# Patient Record
Sex: FEMALE | Race: BLACK OR AFRICAN AMERICAN | Employment: FULL TIME | URBAN - METROPOLITAN AREA
[De-identification: names, ages, dates, MRNs, and addresses within clinical notes are randomized per-mention and may not be internally consistent; named-entity substitution may affect disease eponyms.]

---

## 2018-10-17 ENCOUNTER — OFFICE VISIT (OUTPATIENT)
Dept: PHYSICAL MEDICINE AND REHAB | Age: 58
End: 2018-10-17
Payer: COMMERCIAL

## 2018-10-17 VITALS
WEIGHT: 196 LBS | BODY MASS INDEX: 32.65 KG/M2 | HEART RATE: 68 BPM | DIASTOLIC BLOOD PRESSURE: 79 MMHG | SYSTOLIC BLOOD PRESSURE: 127 MMHG | HEIGHT: 65 IN

## 2018-10-17 DIAGNOSIS — E66.9 OBESITY (BMI 30-39.9): ICD-10-CM

## 2018-10-17 DIAGNOSIS — M47.816 LUMBAR SPONDYLOSIS: Primary | ICD-10-CM

## 2018-10-17 DIAGNOSIS — G89.29 CHRONIC BILATERAL LOW BACK PAIN WITHOUT SCIATICA: ICD-10-CM

## 2018-10-17 DIAGNOSIS — M54.50 CHRONIC BILATERAL LOW BACK PAIN WITHOUT SCIATICA: ICD-10-CM

## 2018-10-17 PROCEDURE — G8427 DOCREV CUR MEDS BY ELIG CLIN: HCPCS | Performed by: PHYSICAL MEDICINE & REHABILITATION

## 2018-10-17 PROCEDURE — G8417 CALC BMI ABV UP PARAM F/U: HCPCS | Performed by: PHYSICAL MEDICINE & REHABILITATION

## 2018-10-17 PROCEDURE — 3017F COLORECTAL CA SCREEN DOC REV: CPT | Performed by: PHYSICAL MEDICINE & REHABILITATION

## 2018-10-17 PROCEDURE — G8484 FLU IMMUNIZE NO ADMIN: HCPCS | Performed by: PHYSICAL MEDICINE & REHABILITATION

## 2018-10-17 PROCEDURE — 4004F PT TOBACCO SCREEN RCVD TLK: CPT | Performed by: PHYSICAL MEDICINE & REHABILITATION

## 2018-10-17 PROCEDURE — 99204 OFFICE O/P NEW MOD 45 MIN: CPT | Performed by: PHYSICAL MEDICINE & REHABILITATION

## 2018-10-17 RX ORDER — DULOXETIN HYDROCHLORIDE 20 MG/1
CAPSULE, DELAYED RELEASE ORAL
Qty: 18 CAPSULE | Refills: 0 | Status: SHIPPED | OUTPATIENT
Start: 2018-10-17 | End: 2018-12-18

## 2018-10-17 RX ORDER — IBUPROFEN 600 MG/1
600 TABLET ORAL DAILY PRN
Qty: 30 TABLET | Refills: 2 | Status: SHIPPED | OUTPATIENT
Start: 2018-10-17 | End: 2019-10-15 | Stop reason: SDUPTHER

## 2018-10-17 RX ORDER — DULOXETIN HYDROCHLORIDE 60 MG/1
60 CAPSULE, DELAYED RELEASE ORAL DAILY
Qty: 30 CAPSULE | Refills: 0 | Status: SHIPPED | OUTPATIENT
Start: 2018-10-17 | End: 2018-12-18 | Stop reason: ALTCHOICE

## 2018-10-17 NOTE — PROGRESS NOTES
Take 1 tablet by mouth daily as needed for Pain (with food)     Dispense:  30 tablet     Refill:  2    DULoxetine (CYMBALTA) 20 MG extended release capsule     Sig: Take 1tab daily for 3d then 2 tab daily for 3 days then 3 tab daily X3d then call for new dose. Dispense:  18 capsule     Refill:  0    DULoxetine (CYMBALTA) 60 MG extended release capsule     Sig: Take 1 capsule by mouth daily Start after titration with 20 mg complete     Dispense:  30 capsule     Refill:  0     · Request prior records MRI  The patient was educated about the diagnosis, prognosis, indications, risks and benefits of treatment. An opportunity to ask questions was given to the patient and questions were answered. The patient agreed to proceed with the recommended treatment as described above. Follow up 6 weeks     Thank you for the consultation and for allowing me to participate in the care of this patient. Sincerely,         Gee Garcia D.O., P.T.   Board Certified Physical Medicine and Rehabilitation  Board Certified Electrodiagnostic Medicine

## 2018-10-24 ENCOUNTER — HOSPITAL ENCOUNTER (OUTPATIENT)
Dept: PHYSICAL THERAPY | Age: 58
Setting detail: THERAPIES SERIES
Discharge: HOME | End: 2018-10-24
Payer: COMMERCIAL

## 2018-10-24 PROCEDURE — G8981 BODY POS CURRENT STATUS: HCPCS | Performed by: PHYSICAL THERAPIST

## 2018-10-24 PROCEDURE — G8982 BODY POS GOAL STATUS: HCPCS | Performed by: PHYSICAL THERAPIST

## 2018-10-24 PROCEDURE — 97161 PT EVAL LOW COMPLEX 20 MIN: CPT | Performed by: PHYSICAL THERAPIST

## 2018-10-24 ASSESSMENT — PAIN DESCRIPTION - PROGRESSION: CLINICAL_PROGRESSION: GRADUALLY WORSENING

## 2018-10-24 ASSESSMENT — PAIN DESCRIPTION - LOCATION: LOCATION: BACK

## 2018-10-24 ASSESSMENT — PAIN DESCRIPTION - PAIN TYPE: TYPE: CHRONIC PAIN

## 2018-10-30 ENCOUNTER — HOSPITAL ENCOUNTER (OUTPATIENT)
Dept: PHYSICAL THERAPY | Age: 58
Setting detail: THERAPIES SERIES
Discharge: HOME | End: 2018-10-30
Payer: COMMERCIAL

## 2018-10-30 PROCEDURE — 97110 THERAPEUTIC EXERCISES: CPT

## 2018-10-30 NOTE — PROGRESS NOTES
[address]      Phone: [phone]  Fax: [phone]    Physical Therapy Daily Treatment Note  Date:  10/30/2018    Patient Name:  Vaughn Patterson    :  1960  MRN: [id number]    Restrictions/Precautions:    Diagnosis:  Lumbar spondylosis, chronic B low back pain without sciatica  Treatment Diagnosis:    Insurance/Certification information:  Medical [place]  Referring Physician:  Gee Garcia DO  Plan of care signed (Y/N):    Visit# / total visits:    Pain level: 3/10   Time In:  1022  Time Out:  1053    Subjective:  Nothing new to report    Exercises:  Exercise/Equipment Resistance/Repetitions Other comments     Trunk stretch with ball X 10 reps ea 10 sec hold      SKTC X 5 reps 10 sec hold      DKTC X 5 reps 10 sec hold      Hamstring stretch PFB x 5 reps ea 10 sec hold      Lower trunk rotation X 5 reps ea 10 sec hold       Piriformis stretch X 5 reps ea 10 sec hold      Pelvic tilts X 10              Bike  X 10 mins                                                                               Other : pt reported w/ supine trunk rotation to L side pain 5/10 .   To right side 0/10     Home Exercise Program:  N/A    Manual Treatments:  N/A    Modalities:  E stim PRN    Timed Code Treatment Minutes:  31    Total Treatment Minutes:  31    Treatment/Activity Tolerance:  [x] Patient tolerated treatment well [] Patient limited by fatigue  [] Patient limited by pain  [] Patient limited by other medical complications  [] Other:     Prognosis: [x] Good [] Fair  [] Poor    Patient Requires Follow-up: [x] Yes  [] No    Plan:   [x] Continue per plan of care [] Alter current plan (see comments)  [] Plan of care initiated [] Hold pending MD visit [] Discharge  Plan for Next Session:        Electronically signed by:  Kavon Erickson, PTA [id number]

## 2018-11-01 ENCOUNTER — HOSPITAL ENCOUNTER (OUTPATIENT)
Dept: PHYSICAL THERAPY | Age: 58
Setting detail: THERAPIES SERIES
Discharge: HOME | End: 2018-11-01
Payer: COMMERCIAL

## 2018-11-01 PROCEDURE — 97110 THERAPEUTIC EXERCISES: CPT

## 2018-11-01 PROCEDURE — G0283 ELEC STIM OTHER THAN WOUND: HCPCS

## 2018-11-01 NOTE — PROGRESS NOTES
[address]      Phone: [phone]  Fax: [phone]    Physical Therapy Daily Treatment Note  Date:  2018    Patient Name:  Socrates Fernandez    :  1960  MRN: [id number]    Restrictions/Precautions:    Diagnosis:  Lumbar spondylosis, chronic B low back pain without sciatica  Treatment Diagnosis:    Insurance/Certification information:  Medical [place]  Referring Physician:  Keena Martin DO  Plan of care signed (Y/N):    Visit# / total visits:  3/12  Pain level: 5/10 LBP initially , 3/10 LBP after session   Time In:  1025  Time Out:  1136    Subjective:  Pt reported increase in LBP on arrival, \" I was on my feet all day yesterday. \"  Pt reported a decrease in LBP after session as noted above    Exercises:  Exercise/Equipment Resistance/Repetitions Other comments     Trunk stretch with ball X 5reps ea 20 sec hold      SKTC X 5 reps 20 sec hold      DKTC X 5 reps 20 sec hold      Hamstring stretch            PFB  x 5 reps ea 20 sec hold      Lower trunk rotation X 3 reps ea 20 sec hold       Piriformis stretch  W/ red ball X 3 reps ea 20 sec hold      Pelvic tilts X 10              Bike  X 10 mins                                                                               Other : pt reported  Continued pain w/ supine trunk rotation to L side pain 5/10 .   To right side 0/10     Home Exercise Program:  N/A    Manual Treatments:  N/A    Modalities:  IFC x 15 mins L LB with HP    Timed Code Treatment Minutes:  55    Total Treatment Minutes:  70    Treatment/Activity Tolerance:  [x] Patient tolerated treatment well [] Patient limited by fatigue  [] Patient limited by pain  [] Patient limited by other medical complications  [] Other:     Prognosis: [x] Good [] Fair  [] Poor    Patient Requires Follow-up: [x] Yes  [] No    Plan:   [x] Continue per plan of care [] Alter current plan (see comments)  [] Plan of care initiated [] Hold pending MD visit []

## 2018-11-06 ENCOUNTER — HOSPITAL ENCOUNTER (OUTPATIENT)
Dept: PHYSICAL THERAPY | Age: 58
Setting detail: THERAPIES SERIES
Discharge: HOME | End: 2018-11-06
Payer: COMMERCIAL

## 2018-11-06 PROCEDURE — 97110 THERAPEUTIC EXERCISES: CPT | Performed by: PHYSICAL THERAPIST

## 2018-11-09 ENCOUNTER — HOSPITAL ENCOUNTER (OUTPATIENT)
Dept: PHYSICAL THERAPY | Age: 58
Setting detail: THERAPIES SERIES
Discharge: HOME | End: 2018-11-09
Payer: COMMERCIAL

## 2018-11-13 ENCOUNTER — HOSPITAL ENCOUNTER (OUTPATIENT)
Dept: PHYSICAL THERAPY | Age: 58
Setting detail: THERAPIES SERIES
Discharge: HOME | End: 2018-11-13
Payer: COMMERCIAL

## 2018-11-13 PROCEDURE — 97110 THERAPEUTIC EXERCISES: CPT | Performed by: PHYSICAL THERAPIST

## 2018-11-13 PROCEDURE — G0283 ELEC STIM OTHER THAN WOUND: HCPCS | Performed by: PHYSICAL THERAPIST

## 2018-11-15 ENCOUNTER — HOSPITAL ENCOUNTER (OUTPATIENT)
Dept: PHYSICAL THERAPY | Age: 58
Setting detail: THERAPIES SERIES
Discharge: HOME | End: 2018-11-15
Payer: COMMERCIAL

## 2018-11-15 PROCEDURE — 97110 THERAPEUTIC EXERCISES: CPT | Performed by: PHYSICAL THERAPIST

## 2018-11-20 ENCOUNTER — HOSPITAL ENCOUNTER (OUTPATIENT)
Dept: PHYSICAL THERAPY | Age: 58
Setting detail: THERAPIES SERIES
Discharge: HOME | End: 2018-11-20
Payer: COMMERCIAL

## 2018-11-20 PROCEDURE — 97110 THERAPEUTIC EXERCISES: CPT

## 2018-11-27 ENCOUNTER — HOSPITAL ENCOUNTER (OUTPATIENT)
Dept: PHYSICAL THERAPY | Age: 58
Setting detail: THERAPIES SERIES
Discharge: HOME | End: 2018-11-27
Payer: COMMERCIAL

## 2018-11-28 ENCOUNTER — OFFICE VISIT (OUTPATIENT)
Dept: PHYSICAL MEDICINE AND REHAB | Age: 58
End: 2018-11-28
Payer: COMMERCIAL

## 2018-11-28 VITALS
DIASTOLIC BLOOD PRESSURE: 75 MMHG | WEIGHT: 204 LBS | HEART RATE: 77 BPM | BODY MASS INDEX: 33.99 KG/M2 | HEIGHT: 65 IN | SYSTOLIC BLOOD PRESSURE: 119 MMHG

## 2018-11-28 DIAGNOSIS — E66.9 OBESITY (BMI 30-39.9): ICD-10-CM

## 2018-11-28 DIAGNOSIS — M54.59 MECHANICAL LOW BACK PAIN: ICD-10-CM

## 2018-11-28 DIAGNOSIS — M54.40 BILATERAL LOW BACK PAIN WITH SCIATICA, SCIATICA LATERALITY UNSPECIFIED, UNSPECIFIED CHRONICITY: Primary | ICD-10-CM

## 2018-11-28 PROCEDURE — G8427 DOCREV CUR MEDS BY ELIG CLIN: HCPCS | Performed by: PHYSICAL MEDICINE & REHABILITATION

## 2018-11-28 PROCEDURE — G8484 FLU IMMUNIZE NO ADMIN: HCPCS | Performed by: PHYSICAL MEDICINE & REHABILITATION

## 2018-11-28 PROCEDURE — 3017F COLORECTAL CA SCREEN DOC REV: CPT | Performed by: PHYSICAL MEDICINE & REHABILITATION

## 2018-11-28 PROCEDURE — G8417 CALC BMI ABV UP PARAM F/U: HCPCS | Performed by: PHYSICAL MEDICINE & REHABILITATION

## 2018-11-28 PROCEDURE — 99214 OFFICE O/P EST MOD 30 MIN: CPT | Performed by: PHYSICAL MEDICINE & REHABILITATION

## 2018-11-28 PROCEDURE — 4004F PT TOBACCO SCREEN RCVD TLK: CPT | Performed by: PHYSICAL MEDICINE & REHABILITATION

## 2018-11-28 NOTE — PROGRESS NOTES
Patricia Shelley D.O. [facility]  [address]  Phone: [phone]  Fax: [phone]        11/28/18    Chief Complaint   Patient presents with    Back Pain     6 week follow up       HPI:  Milind Morton is a 66y.o. year old woman seen today in follow up regarding low back pain. Interval history: Since the last visit the patient has been in PT. She never picked up the Cymbalta ordered last visit. Today, the pain is rated Pain Score:   7 where 0 is no pain and 10 is pain as bad as it can be. The pain is located in the low back,  does not radiate and is described as sharp. This pain occurs intermittent. The symptoms have been unchanged since onset. Symptoms are exacerbated by unknown. Factors which relieve the pain include nothing. Other associated symptoms include stiffness. Otherwise, the pain assessment has not changed since the last visit. History reviewed. No pertinent past medical history. Past Surgical History:   Procedure Laterality Date    ABDOMINAL EXPLORATION SURGERY      BREAST SURGERY      COLONOSCOPY  09/11/2012    -normal    ENDOMETRIAL ABLATION      UPPER GASTROINTESTINAL ENDOSCOPY  07/29/2014    multiple gstric and duodenal polyps       Social History     Social History    Marital status:      Spouse name: N/A    Number of children: N/A    Years of education: N/A     Social History Main Topics    Smoking status: Current Every Day Smoker     Packs/day: 1.00     Types: Cigars, Cigarettes    Smokeless tobacco: Never Used      Comment: 2 CIGARS DAILY    Alcohol use No      Comment: SOCIAL OCCAS    Drug use: No    Sexual activity: Not Asked     Other Topics Concern    None     Social History Narrative    None       History reviewed. No pertinent family history.     Current Outpatient Prescriptions   Medication Sig Dispense Refill    DULoxetine (CYMBALTA) 20 MG extended release capsule Take 1tab daily for 3d then 2 tab daily for 3 days then 3 tab daily X3d then call for new dose. 18 capsule 0    acetaminophen (TYLENOL) 325 MG tablet Take 2 tablets by mouth every 6 hours as needed for Pain 20 tablet 0    Cholecalciferol (VITAMIN D3) 56869 units CAPS Take 50,000 Units by mouth once a week 1 capsule 5    ibuprofen (ADVIL;MOTRIN) 600 MG tablet Take 1 tablet by mouth daily as needed for Pain (with food) 30 tablet 2    DULoxetine (CYMBALTA) 60 MG extended release capsule Take 1 capsule by mouth daily Start after titration with 20 mg complete 30 capsule 0     No current facility-administered medications for this visit. Allergies   Allergen Reactions    Aspirin      UPSET STOMACH       Review of Systems:  No new weakness, paresthesia, incontinence of bowel or bladder, saddle anesthesia, falls or gait dysfunction. Otherwise, per HPI. Physical Exam:   Blood pressure 119/75, pulse 77, height 5' 5\" (1.651 m), weight 204 lb (92.5 kg). GENERAL: The patient is in no apparent distress. Body habitus is obese. HEENT: No rhinorrhea, sneezing, yawning, or lacrimation. No scleral icterus or conjunctival injection. SKIN: No piloerection. No tract marks. No rash. PSYCH: Mood and affect are appropriate. Hygiene is appropriate. CARDIOVASCULAR  Heart is regular rate and rhythm. There is no edema. RESPIRATORY: Respirations are regular and unlabored. There is no cyanosis. GASTROINTESTINAL: Soft abdomen, non-tender. MSK: There is no joint effusion, deformity, instability, swelling, erythema or warmth. AROM is full in the spine and extremities. Spinal curvatures are normal.      NEURO: Gait is normal. No focal sensorimotor deficit. Reflexes 2+ and symmetric in lower extremities. Impression:   1. Bilateral low back pain with sciatica, sciatica laterality unspecified, unspecified chronicity    2. Obesity (BMI 30-39.9)    3.  Mechanical low back pain        Plan:  Orders Placed This Encounter

## 2018-11-29 ENCOUNTER — HOSPITAL ENCOUNTER (OUTPATIENT)
Dept: PHYSICAL THERAPY | Age: 58
Setting detail: THERAPIES SERIES
Discharge: HOME | End: 2018-11-29
Payer: COMMERCIAL

## 2018-11-29 PROCEDURE — G0283 ELEC STIM OTHER THAN WOUND: HCPCS | Performed by: PHYSICAL THERAPIST

## 2018-11-29 PROCEDURE — 97110 THERAPEUTIC EXERCISES: CPT | Performed by: PHYSICAL THERAPIST

## 2018-11-29 NOTE — PROGRESS NOTES
[address] [address] [place]      Phone: [phone]  Fax: [phone]    Physical Therapy Daily Treatment Note  Date:  2018    Patient Name:  Jeane Real    :  1960  MRN: [id number]    Restrictions/Precautions:    Diagnosis:  Lumbar spondylosis, chronic B low back pain without sciatica  Treatment Diagnosis:    Insurance/Certification information:  Medical [place]  Referring Physician:  Caroline Lambert DO  Plan of care signed (Y/N):    Visit# / total visits:    Pain level: 7/10  Time In:  1030  Time Out:  1120    Subjective:  Pt reports increased pain today.     Exercises:  Exercise/Equipment Resistance/Repetitions Other comments     Trunk stretch with ball X 5reps ea 20 sec       SKTC X 5 reps 20 sec       DKTC X 5 reps 20 sec       Hamstring stretch            PFB  x 5 reps ea 20 sec       Lower trunk rotation X 5 reps ea 20 sec       Piriformis stretch  W/ red ball X 3 reps ea 20 sec       Pelvic tilts 2 x 10                                                                                     Other : N/A    Home Exercise Program:  N/A    Manual Treatments:  N/A    Modalities: IFC with HP to low back x 15 minutes    Timed Code Treatment Minutes:  35    Total Treatment Minutes:  50    Treatment/Activity Tolerance:  [x] Patient tolerated treatment well [] Patient limited by fatigue  [] Patient limited by pain  [] Patient limited by other medical complications  [] Other:     Prognosis: [x] Good [] Fair  [] Poor    Patient Requires Follow-up: [x] Yes  [] No    Plan:   [x] Continue per plan of care [] Alter current plan (see comments)  [] Plan of care initiated [] Hold pending MD visit [] Discharge  Plan for Next Session:        Electronically signed by:  Rebecca Nathan, PT [id number]

## 2018-12-04 ENCOUNTER — HOSPITAL ENCOUNTER (OUTPATIENT)
Dept: PHYSICAL THERAPY | Age: 58
Setting detail: THERAPIES SERIES
Discharge: HOME | End: 2018-12-04
Payer: COMMERCIAL

## 2018-12-04 PROCEDURE — G0283 ELEC STIM OTHER THAN WOUND: HCPCS

## 2018-12-04 PROCEDURE — 97110 THERAPEUTIC EXERCISES: CPT

## 2018-12-05 ENCOUNTER — TELEPHONE (OUTPATIENT)
Dept: ADMINISTRATIVE | Age: 58
End: 2018-12-05

## 2018-12-06 ENCOUNTER — HOSPITAL ENCOUNTER (OUTPATIENT)
Dept: PHYSICAL THERAPY | Age: 58
Setting detail: THERAPIES SERIES
Discharge: HOME | End: 2018-12-06
Payer: COMMERCIAL

## 2018-12-06 PROCEDURE — G8981 BODY POS CURRENT STATUS: HCPCS | Performed by: PHYSICAL THERAPIST

## 2018-12-06 PROCEDURE — G8982 BODY POS GOAL STATUS: HCPCS | Performed by: PHYSICAL THERAPIST

## 2018-12-06 PROCEDURE — G0283 ELEC STIM OTHER THAN WOUND: HCPCS | Performed by: PHYSICAL THERAPIST

## 2018-12-06 PROCEDURE — 97110 THERAPEUTIC EXERCISES: CPT | Performed by: PHYSICAL THERAPIST

## 2018-12-06 NOTE — PROGRESS NOTES
[address] [address] [place]      Phone: [phone]  Fax: [phone]    Physical Therapy Daily Treatment Note  Date:  2018    Patient Name:  Lisa Diaz    :  1960  MRN: [id number]    Restrictions/Precautions:    Diagnosis:  Lumbar spondylosis, chronic B low back pain without sciatica  Treatment Diagnosis:    Insurance/Certification information:  Medical [place]  Referring Physician:  Riya Elise DO  Plan of care signed (Y/N):    Visit# / total visits:  10/12  Pain level: 7/10  Time In:  1029  Time Out:  1115    Subjective:  Pt reports MRI is scheduled for .     Exercises:  Exercise/Equipment Resistance/Repetitions Other comments     Trunk stretch with ball X 5reps ea 20 sec       SKTC X 5 reps 20 sec       DKTC X 5 reps 20 sec       Hamstring stretch            PFB  x 5 reps ea 20 sec       Lower trunk rotation X 5 reps ea 20 sec       Piriformis stretch  W/ red ball X 3 reps ea 20 sec       Pelvic tilts 2 x 10                                                                                     Other : N/A    Home Exercise Program:  N/A    Manual Treatments:  N/A    Modalities: IFC with HP to low back x 15 minutes    Timed Code Treatment Minutes:  31    Total Treatment Minutes:  46    Treatment/Activity Tolerance:  [x] Patient tolerated treatment well [] Patient limited by fatigue  [] Patient limited by pain  [] Patient limited by other medical complications  [] Other:     Prognosis: [] Good [x] Fair  [] Poor    Patient Requires Follow-up: [x] Yes  [] No    Plan:   [x] Continue per plan of care [] Alter current plan (see comments)  [] Plan of care initiated [] Hold pending MD visit [] Discharge  Plan for Next Session:        Electronically signed by:  Wendy Higgins, PT [id number]

## 2018-12-11 ENCOUNTER — HOSPITAL ENCOUNTER (OUTPATIENT)
Dept: MRI IMAGING | Age: 58
Discharge: HOME | End: 2018-12-13
Payer: COMMERCIAL

## 2018-12-11 ENCOUNTER — HOSPITAL ENCOUNTER (OUTPATIENT)
Dept: PHYSICAL THERAPY | Age: 58
Setting detail: THERAPIES SERIES
Discharge: HOME | End: 2018-12-11
Payer: COMMERCIAL

## 2018-12-11 ENCOUNTER — TELEPHONE (OUTPATIENT)
Dept: PHYSICAL MEDICINE AND REHAB | Age: 58
End: 2018-12-11

## 2018-12-11 DIAGNOSIS — M54.40 BILATERAL LOW BACK PAIN WITH SCIATICA, SCIATICA LATERALITY UNSPECIFIED, UNSPECIFIED CHRONICITY: ICD-10-CM

## 2018-12-11 DIAGNOSIS — E66.9 OBESITY (BMI 30-39.9): ICD-10-CM

## 2018-12-11 DIAGNOSIS — M54.59 MECHANICAL LOW BACK PAIN: ICD-10-CM

## 2018-12-11 PROCEDURE — G0283 ELEC STIM OTHER THAN WOUND: HCPCS

## 2018-12-11 PROCEDURE — 72148 MRI LUMBAR SPINE W/O DYE: CPT

## 2018-12-11 PROCEDURE — 97110 THERAPEUTIC EXERCISES: CPT

## 2018-12-11 NOTE — TELEPHONE ENCOUNTER
----- Message from Sina Kennedy DO sent at 12/11/2018  3:08 PM EST -----  Reviewed test abnormal, please schedule follow up to review/discuss.

## 2018-12-13 ENCOUNTER — HOSPITAL ENCOUNTER (OUTPATIENT)
Dept: PHYSICAL THERAPY | Age: 58
Setting detail: THERAPIES SERIES
Discharge: HOME | End: 2018-12-13
Payer: COMMERCIAL

## 2018-12-13 PROCEDURE — G8983 BODY POS D/C STATUS: HCPCS | Performed by: PHYSICAL THERAPIST

## 2018-12-13 PROCEDURE — G0283 ELEC STIM OTHER THAN WOUND: HCPCS | Performed by: PHYSICAL THERAPIST

## 2018-12-13 PROCEDURE — 97110 THERAPEUTIC EXERCISES: CPT | Performed by: PHYSICAL THERAPIST

## 2018-12-13 PROCEDURE — G8982 BODY POS GOAL STATUS: HCPCS | Performed by: PHYSICAL THERAPIST

## 2018-12-18 ENCOUNTER — OFFICE VISIT (OUTPATIENT)
Dept: PHYSICAL MEDICINE AND REHAB | Age: 58
End: 2018-12-18
Payer: COMMERCIAL

## 2018-12-18 VITALS
WEIGHT: 199 LBS | SYSTOLIC BLOOD PRESSURE: 132 MMHG | HEIGHT: 66 IN | DIASTOLIC BLOOD PRESSURE: 87 MMHG | BODY MASS INDEX: 31.98 KG/M2 | HEART RATE: 58 BPM

## 2018-12-18 DIAGNOSIS — M47.816 FACET ARTHROPATHY, LUMBAR: ICD-10-CM

## 2018-12-18 DIAGNOSIS — M51.36 BULGING LUMBAR DISC: ICD-10-CM

## 2018-12-18 DIAGNOSIS — G89.29 CHRONIC BILATERAL LOW BACK PAIN WITHOUT SCIATICA: Primary | ICD-10-CM

## 2018-12-18 DIAGNOSIS — M54.50 CHRONIC BILATERAL LOW BACK PAIN WITHOUT SCIATICA: Primary | ICD-10-CM

## 2018-12-18 DIAGNOSIS — M51.36 DDD (DEGENERATIVE DISC DISEASE), LUMBAR: ICD-10-CM

## 2018-12-18 PROCEDURE — 4004F PT TOBACCO SCREEN RCVD TLK: CPT | Performed by: PHYSICAL MEDICINE & REHABILITATION

## 2018-12-18 PROCEDURE — G8417 CALC BMI ABV UP PARAM F/U: HCPCS | Performed by: PHYSICAL MEDICINE & REHABILITATION

## 2018-12-18 PROCEDURE — G8484 FLU IMMUNIZE NO ADMIN: HCPCS | Performed by: PHYSICAL MEDICINE & REHABILITATION

## 2018-12-18 PROCEDURE — 3017F COLORECTAL CA SCREEN DOC REV: CPT | Performed by: PHYSICAL MEDICINE & REHABILITATION

## 2018-12-18 PROCEDURE — G8427 DOCREV CUR MEDS BY ELIG CLIN: HCPCS | Performed by: PHYSICAL MEDICINE & REHABILITATION

## 2018-12-18 PROCEDURE — 99214 OFFICE O/P EST MOD 30 MIN: CPT | Performed by: PHYSICAL MEDICINE & REHABILITATION

## 2018-12-18 RX ORDER — TRAMADOL HYDROCHLORIDE 50 MG/1
50 TABLET ORAL EVERY 8 HOURS PRN
Qty: 21 TABLET | Refills: 0 | Status: SHIPPED | OUTPATIENT
Start: 2018-12-18 | End: 2018-12-25

## 2019-01-29 ENCOUNTER — OFFICE VISIT (OUTPATIENT)
Dept: PAIN MANAGEMENT | Age: 59
End: 2019-01-29
Payer: COMMERCIAL

## 2019-01-29 VITALS
SYSTOLIC BLOOD PRESSURE: 124 MMHG | BODY MASS INDEX: 31.82 KG/M2 | TEMPERATURE: 98.1 F | WEIGHT: 198 LBS | HEART RATE: 80 BPM | DIASTOLIC BLOOD PRESSURE: 84 MMHG | HEIGHT: 66 IN | OXYGEN SATURATION: 98 % | RESPIRATION RATE: 16 BRPM

## 2019-01-29 DIAGNOSIS — G89.29 CHRONIC BILATERAL LOW BACK PAIN WITHOUT SCIATICA: ICD-10-CM

## 2019-01-29 DIAGNOSIS — M47.817 LUMBOSACRAL SPONDYLOSIS WITHOUT MYELOPATHY: ICD-10-CM

## 2019-01-29 DIAGNOSIS — G89.4 CHRONIC PAIN SYNDROME: Primary | ICD-10-CM

## 2019-01-29 DIAGNOSIS — M54.50 CHRONIC BILATERAL LOW BACK PAIN WITHOUT SCIATICA: ICD-10-CM

## 2019-01-29 PROCEDURE — 99204 OFFICE O/P NEW MOD 45 MIN: CPT | Performed by: PAIN MEDICINE

## 2019-01-29 PROCEDURE — 3017F COLORECTAL CA SCREEN DOC REV: CPT | Performed by: PAIN MEDICINE

## 2019-01-29 PROCEDURE — G8417 CALC BMI ABV UP PARAM F/U: HCPCS | Performed by: PAIN MEDICINE

## 2019-01-29 PROCEDURE — 4004F PT TOBACCO SCREEN RCVD TLK: CPT | Performed by: PAIN MEDICINE

## 2019-01-29 PROCEDURE — G8427 DOCREV CUR MEDS BY ELIG CLIN: HCPCS | Performed by: PAIN MEDICINE

## 2019-01-29 PROCEDURE — G8484 FLU IMMUNIZE NO ADMIN: HCPCS | Performed by: PAIN MEDICINE

## 2019-01-29 RX ORDER — IBUPROFEN 200 MG
200 TABLET ORAL EVERY 6 HOURS PRN
COMMUNITY
End: 2022-04-20

## 2019-01-29 RX ORDER — HYDROCODONE BITARTRATE AND ACETAMINOPHEN 5; 325 MG/1; MG/1
TABLET ORAL
Refills: 0 | COMMUNITY
Start: 2019-01-10 | End: 2019-01-29

## 2019-01-29 RX ORDER — AMOXICILLIN 500 MG/1
CAPSULE ORAL
Refills: 0 | COMMUNITY
Start: 2019-01-06 | End: 2019-01-29

## 2020-01-22 NOTE — DISCHARGE SUMMARY
[address]      Phone: [phone]  Fax: [phone]    Physical Therapy  Out Patient Discharge Summary     Date:  2018    Patient Name:  Katerine Doll    :  1960  MRN: [id number]    DIAGNOSIS:  Lumbar spondylosis, chronic B low back pain without sciatica  REFERRING PHYSICIAN:  Isabel Vieira DO    ATTENDANCE:  Pt has attended 15 of 12 scheduled treatments from 10/24/18 to 18. TREATMENTS RECEIVED:  Stretching, core strengthening, education in a HEP, E-stim    INITIAL STATUS:  · Pain in low back 3-4/10 on average  · Strength: B hips and core 4/5    FINAL STATUS:  · Pain in low back continues to be 3-5/10 on average  · Strength 5/5 throughout  · Pt is independent with HEP    GOALS:  2 out of 3 Long Term Goals were obtained. LONG TERM GOALS NOT OBTAINED/REASON:  Long term pain did not respond to PT interventions     PATIENT GOALS:  To decrease pain    REASON FOR DISCHARGE:  Pt has received maximum benefit from PT    PATIENT EDUCATION/INSTRUCTIONS:  Pt provided with a HEP of stretches and core stability exercises. RECOMMENDATIONS:  Discharge to HEP. Pt received pain relief from E-stim and would benefit from a home TENS unit. Thank you for the opportunity to work with your patient. If you have questions or comments, please feel free to contact me by phone or fax.       Electronically Signed by: Ron Jarvis PT [id number]  2018 Check x-ray today, will message with results  Try tizanidine muscle relaxant every  6 hours as needed, may continue with small doses of ibuprofen as needed  See neck exercises below  Try alternative medication for cholesterol - rosuvastatin 10mg daily. Call if you develop muscle aches with this med as well      Neck Exercises: Arm Lift     Â       To start, lie on your back, knees bent and feet flat on the floor. Keep your ears, shoulders, and hips aligned, but donât press your lower back to the floor. Rest your hands on your pelvis. Breathe deeply and relax. Â· Raise one arm overhead, then lower it. As you lower that arm, raise the other arm. Â· Continue to move both arms in slow, smooth arcs. Keep your arms straight and your head and neck relaxed. Â· RepeatÂ 10Â times with each arm. For your safety, check with your healthcare provider before starting an exercise program.     Neck Exercises: Neck/Torso Rotation   To start, lie on your back, knees bent and feet flat on the floor. Keep your ears, shoulders, and hips aligned, but donât press your lower back to the floor. Breathe deeply and relax. Â· From starting position, drop both knees to one side. At the same time, turn your head and look in the other direction. Â· Keep both feet in contact with the floor and keep your arms at your sides. Â· Hold for five seconds. Then slowly switch sides. Â· Repeat five times. Exercises: Neck Isometrics  To start, sit in a chair with your feet flat on the floor. Your weight should be slightly forward so that youâre balanced evenly on your buttocks. Relax your shoulders and keep your head level. Using a chair with arms may help you keep your balance. 1. Press your palm against your forehead. Resist with your neck muscles. Hold forÂ 10Â seconds. Relax. Repeat 5 times. 2. Do the exercise again, pressing on the side of your head. RepeatÂ 5Â times. Switch sides. 3. Do the exercise again, pressing on the back of your head. RepeatÂ 5Â times. For your safety, check with your healthcare provider before starting an exercise program.     Neck Exercises: Head Lifts  Â   Do this exercise on your hands and knees. Keep your knees under your hips and your hands under your shoulders. Keep your spine in a neutral position (not arched or sagging). Keep your ears in line with your shoulders. Hold for a few seconds before starting the exercise. Â· Keeping your back straight, slowly drop your chin toward your chest. Tuck in your chin. Â· Hold forÂ 5Â seconds. Then lift your head until your neck is level with your back. Â· Hold forÂ 5Â seconds. RepeatÂ 5Â times. Neck Exercises: Active Neck Rotation    To start, lie on your back, knees bent and feet flat on the floor. Keep your ears, shoulders, and hips aligned, but donât press your lower back to the floor. Rest your hands on your pelvis. Breathe deeply and relax. Â· Use your neck muscles to turn your head to one side until you feel a stretch in the muscles. Â· Hold forÂ 5Â seconds. Then turn to the other side. Â· RepeatÂ 5Â times on each side. Note: Keep your shoulders on the floor. Donât lift or tuck your chin as you turn your head. Â© [place], 2016 [facility], [place], [facility] [address] All rights reserved. This information is not intended as a substitute for professional medical care. Always follow your healthcare professional's instructions.

## 2020-08-11 ENCOUNTER — HOSPITAL ENCOUNTER (INPATIENT)
Age: 60
LOS: 1 days | Discharge: HOME | DRG: 156 | End: 2020-08-12
Attending: EMERGENCY MEDICINE | Admitting: INTERNAL MEDICINE
Payer: COMMERCIAL

## 2020-08-11 ENCOUNTER — APPOINTMENT (OUTPATIENT)
Dept: GENERAL RADIOLOGY | Age: 60
DRG: 156 | End: 2020-08-11
Payer: COMMERCIAL

## 2020-08-11 PROBLEM — J96.00 ACUTE RESPIRATORY FAILURE (HCC): Status: ACTIVE | Noted: 2020-08-11

## 2020-08-11 LAB
ALBUMIN SERPL-MCNC: 3.7 G/DL (ref 3.5–5.2)
ALP BLD-CCNC: 83 U/L (ref 35–104)
ALT SERPL-CCNC: 10 U/L (ref 0–32)
ANION GAP SERPL CALCULATED.3IONS-SCNC: 11 MMOL/L (ref 7–16)
AST SERPL-CCNC: 15 U/L (ref 0–31)
BACTERIA: NORMAL /HPF
BASOPHILS ABSOLUTE: 0.03 E9/L (ref 0–0.2)
BASOPHILS RELATIVE PERCENT: 0.5 % (ref 0–2)
BILIRUB SERPL-MCNC: 0.3 MG/DL (ref 0–1.2)
BILIRUBIN URINE: NEGATIVE
BLOOD, URINE: ABNORMAL
BUN BLDV-MCNC: 10 MG/DL (ref 6–20)
CALCIUM SERPL-MCNC: 8.9 MG/DL (ref 8.6–10.2)
CHLORIDE BLD-SCNC: 104 MMOL/L (ref 98–107)
CLARITY: CLEAR
CO2: 21 MMOL/L (ref 22–29)
COLOR: YELLOW
CREAT SERPL-MCNC: 0.9 MG/DL (ref 0.5–1)
EOSINOPHILS ABSOLUTE: 0.06 E9/L (ref 0.05–0.5)
EOSINOPHILS RELATIVE PERCENT: 0.9 % (ref 0–6)
EPITHELIAL CELLS, UA: NORMAL /HPF
GFR AFRICAN AMERICAN: >60
GFR NON-AFRICAN AMERICAN: >60 ML/MIN/1.73
GLUCOSE BLD-MCNC: 113 MG/DL (ref 74–99)
GLUCOSE URINE: NEGATIVE MG/DL
HCT VFR BLD CALC: 43.5 % (ref 34–48)
HEMOGLOBIN: 14.9 G/DL (ref 11.5–15.5)
IMMATURE GRANULOCYTES #: 0.02 E9/L
IMMATURE GRANULOCYTES %: 0.3 % (ref 0–5)
KETONES, URINE: NEGATIVE MG/DL
LACTIC ACID: 2.4 MMOL/L (ref 0.5–2.2)
LEUKOCYTE ESTERASE, URINE: NEGATIVE
LYMPHOCYTES ABSOLUTE: 1.13 E9/L (ref 1.5–4)
LYMPHOCYTES RELATIVE PERCENT: 17.7 % (ref 20–42)
MCH RBC QN AUTO: 32.6 PG (ref 26–35)
MCHC RBC AUTO-ENTMCNC: 34.3 % (ref 32–34.5)
MCV RBC AUTO: 95.2 FL (ref 80–99.9)
MONOCYTES ABSOLUTE: 0.26 E9/L (ref 0.1–0.95)
MONOCYTES RELATIVE PERCENT: 4.1 % (ref 2–12)
NEUTROPHILS ABSOLUTE: 4.89 E9/L (ref 1.8–7.3)
NEUTROPHILS RELATIVE PERCENT: 76.5 % (ref 43–80)
NITRITE, URINE: NEGATIVE
PDW BLD-RTO: 13 FL (ref 11.5–15)
PH UA: 5.5 (ref 5–9)
PLATELET # BLD: 199 E9/L (ref 130–450)
PMV BLD AUTO: 8.7 FL (ref 7–12)
POTASSIUM REFLEX MAGNESIUM: 4 MMOL/L (ref 3.5–5)
PROTEIN UA: NEGATIVE MG/DL
RBC # BLD: 4.57 E12/L (ref 3.5–5.5)
RBC UA: NORMAL /HPF (ref 0–2)
SODIUM BLD-SCNC: 136 MMOL/L (ref 132–146)
SPECIFIC GRAVITY UA: 1.01 (ref 1–1.03)
STREP GRP A PCR: NEGATIVE
TOTAL PROTEIN: 6.8 G/DL (ref 6.4–8.3)
TROPONIN: <0.01 NG/ML (ref 0–0.03)
UROBILINOGEN, URINE: 0.2 E.U./DL
WBC # BLD: 6.4 E9/L (ref 4.5–11.5)
WBC UA: NORMAL /HPF (ref 0–5)

## 2020-08-11 PROCEDURE — 71045 X-RAY EXAM CHEST 1 VIEW: CPT

## 2020-08-11 PROCEDURE — 87088 URINE BACTERIA CULTURE: CPT

## 2020-08-11 PROCEDURE — 2580000003 HC RX 258: Performed by: EMERGENCY MEDICINE

## 2020-08-11 PROCEDURE — 80053 COMPREHEN METABOLIC PANEL: CPT

## 2020-08-11 PROCEDURE — 99283 EMERGENCY DEPT VISIT LOW MDM: CPT

## 2020-08-11 PROCEDURE — 87880 STREP A ASSAY W/OPTIC: CPT

## 2020-08-11 PROCEDURE — 96372 THER/PROPH/DIAG INJ SC/IM: CPT

## 2020-08-11 PROCEDURE — 6360000002 HC RX W HCPCS: Performed by: EMERGENCY MEDICINE

## 2020-08-11 PROCEDURE — 96374 THER/PROPH/DIAG INJ IV PUSH: CPT

## 2020-08-11 PROCEDURE — 6370000000 HC RX 637 (ALT 250 FOR IP): Performed by: EMERGENCY MEDICINE

## 2020-08-11 PROCEDURE — 36415 COLL VENOUS BLD VENIPUNCTURE: CPT

## 2020-08-11 PROCEDURE — 81001 URINALYSIS AUTO W/SCOPE: CPT

## 2020-08-11 PROCEDURE — 51701 INSERT BLADDER CATHETER: CPT

## 2020-08-11 PROCEDURE — G0378 HOSPITAL OBSERVATION PER HR: HCPCS

## 2020-08-11 PROCEDURE — 85025 COMPLETE CBC W/AUTO DIFF WBC: CPT

## 2020-08-11 PROCEDURE — 99284 EMERGENCY DEPT VISIT MOD MDM: CPT

## 2020-08-11 PROCEDURE — 84484 ASSAY OF TROPONIN QUANT: CPT

## 2020-08-11 PROCEDURE — 93005 ELECTROCARDIOGRAM TRACING: CPT | Performed by: EMERGENCY MEDICINE

## 2020-08-11 PROCEDURE — 2060000000 HC ICU INTERMEDIATE R&B

## 2020-08-11 PROCEDURE — 87040 BLOOD CULTURE FOR BACTERIA: CPT

## 2020-08-11 PROCEDURE — U0002 COVID-19 LAB TEST NON-CDC: HCPCS

## 2020-08-11 PROCEDURE — 83605 ASSAY OF LACTIC ACID: CPT

## 2020-08-11 RX ORDER — ACETAMINOPHEN 500 MG
1000 TABLET ORAL ONCE
Status: COMPLETED | OUTPATIENT
Start: 2020-08-11 | End: 2020-08-11

## 2020-08-11 RX ORDER — 0.9 % SODIUM CHLORIDE 0.9 %
1000 INTRAVENOUS SOLUTION INTRAVENOUS ONCE
Status: COMPLETED | OUTPATIENT
Start: 2020-08-11 | End: 2020-08-11

## 2020-08-11 RX ORDER — DEXAMETHASONE SODIUM PHOSPHATE 10 MG/ML
6 INJECTION, SOLUTION INTRAMUSCULAR; INTRAVENOUS ONCE
Status: COMPLETED | OUTPATIENT
Start: 2020-08-11 | End: 2020-08-11

## 2020-08-11 RX ADMIN — SODIUM CHLORIDE 1000 ML: 9 INJECTION, SOLUTION INTRAVENOUS at 22:05

## 2020-08-11 RX ADMIN — DEXAMETHASONE SODIUM PHOSPHATE 6 MG: 10 INJECTION INTRAMUSCULAR; INTRAVENOUS at 22:50

## 2020-08-11 RX ADMIN — ACETAMINOPHEN 1000 MG: 500 TABLET ORAL at 22:05

## 2020-08-11 ASSESSMENT — ENCOUNTER SYMPTOMS
SHORTNESS OF BREATH: 0
EYE REDNESS: 0
VOMITING: 0
ABDOMINAL PAIN: 0
NAUSEA: 0
COUGH: 1

## 2020-08-11 ASSESSMENT — PAIN SCALES - GENERAL: PAINLEVEL_OUTOF10: 0

## 2020-08-12 VITALS
HEIGHT: 65 IN | BODY MASS INDEX: 34.16 KG/M2 | DIASTOLIC BLOOD PRESSURE: 81 MMHG | RESPIRATION RATE: 16 BRPM | OXYGEN SATURATION: 99 % | SYSTOLIC BLOOD PRESSURE: 154 MMHG | TEMPERATURE: 97.1 F | WEIGHT: 205 LBS | HEART RATE: 73 BPM

## 2020-08-12 PROBLEM — R50.9 FEVER AND CHILLS: Status: ACTIVE | Noted: 2020-08-12

## 2020-08-12 LAB
EKG ATRIAL RATE: 99 BPM
EKG P AXIS: 46 DEGREES
EKG P-R INTERVAL: 178 MS
EKG Q-T INTERVAL: 346 MS
EKG QRS DURATION: 104 MS
EKG QTC CALCULATION (BAZETT): 444 MS
EKG R AXIS: 85 DEGREES
EKG T AXIS: 14 DEGREES
EKG VENTRICULAR RATE: 99 BPM
SARS-COV-2, NAAT: NOT DETECTED

## 2020-08-12 PROCEDURE — 6360000002 HC RX W HCPCS: Performed by: INTERNAL MEDICINE

## 2020-08-12 PROCEDURE — 93010 ELECTROCARDIOGRAM REPORT: CPT | Performed by: INTERNAL MEDICINE

## 2020-08-12 PROCEDURE — 6370000000 HC RX 637 (ALT 250 FOR IP): Performed by: INTERNAL MEDICINE

## 2020-08-12 PROCEDURE — G0378 HOSPITAL OBSERVATION PER HR: HCPCS

## 2020-08-12 PROCEDURE — 94664 DEMO&/EVAL PT USE INHALER: CPT

## 2020-08-12 RX ORDER — CLARITHROMYCIN 500 MG/1
500 TABLET, COATED ORAL 2 TIMES DAILY
Qty: 20 TABLET | Refills: 0 | Status: SHIPPED | OUTPATIENT
Start: 2020-08-12 | End: 2020-08-22

## 2020-08-12 RX ORDER — BENZONATATE 100 MG/1
100 CAPSULE ORAL 3 TIMES DAILY PRN
Status: DISCONTINUED | OUTPATIENT
Start: 2020-08-12 | End: 2020-08-12 | Stop reason: HOSPADM

## 2020-08-12 RX ORDER — IPRATROPIUM BROMIDE AND ALBUTEROL SULFATE 2.5; .5 MG/3ML; MG/3ML
1 SOLUTION RESPIRATORY (INHALATION) 4 TIMES DAILY
Status: DISCONTINUED | OUTPATIENT
Start: 2020-08-12 | End: 2020-08-12 | Stop reason: HOSPADM

## 2020-08-12 RX ORDER — ACETAMINOPHEN 325 MG/1
650 TABLET ORAL EVERY 4 HOURS PRN
Status: DISCONTINUED | OUTPATIENT
Start: 2020-08-12 | End: 2020-08-12 | Stop reason: HOSPADM

## 2020-08-12 RX ADMIN — ENOXAPARIN SODIUM 40 MG: 40 INJECTION SUBCUTANEOUS at 09:13

## 2020-08-12 RX ADMIN — IPRATROPIUM BROMIDE AND ALBUTEROL SULFATE 1 AMPULE: .5; 3 SOLUTION RESPIRATORY (INHALATION) at 10:27

## 2020-08-12 ASSESSMENT — PAIN SCALES - GENERAL
PAINLEVEL_OUTOF10: 0

## 2020-08-12 NOTE — ED PROVIDER NOTES
endoscopy (07/29/2014); Breast surgery; and Endometrial ablation. Social History:  reports that she has been smoking cigars and cigarettes. She started smoking about 40 years ago. She has a 39.00 pack-year smoking history. She has never used smokeless tobacco. She reports that she does not drink alcohol or use drugs. Family History: family history is not on file. The patients home medications have been reviewed. Allergies: Aspirin    ---------------------------------------------------PHYSICAL EXAM--------------------------------------    Constitutional/General: Alert and oriented x3, well appearing, non toxic in NAD  Head: Normocephalic and atraumatic  Nose: Moderate rhinorrhea present  Mouth: Oropharynx clear, handling secretions, no trismus  Neck: Supple, full ROM,   Pulmonary: Lungs clear to auscultation bilaterally, no wheezes, rales, or rhonchi. Not in respiratory distress  Cardiovascular:  Regular rate. Regular rhythm. No murmurs  Chest: no chest wall tenderness  Abdomen: Soft. Non tender. Non distended. +BS. No rebound, guarding, or rigidity. No pulsatile masses appreciated. Musculoskeletal: Moves all extremities x 4. Warm and well perfused, no clubbing, cyanosis, or edema. Capillary refill <3 seconds  Skin: warm and dry. No rashes. Neurologic: GCS 15, no gross focal neurologic deficits  Psych: Normal Affect    -------------------------------------------------- RESULTS -------------------------------------------------  I have personally reviewed all laboratory and imaging results for this patient. Results are listed below.      LABS:  Results for orders placed or performed during the hospital encounter of 08/11/20   Strep Screen Group A Throat    Specimen: Throat   Result Value Ref Range    Strep Grp A PCR Negative Negative   CBC Auto Differential   Result Value Ref Range    WBC 6.4 4.5 - 11.5 E9/L    RBC 4.57 3.50 - 5.50 E12/L    Hemoglobin 14.9 11.5 - 15.5 g/dL    Hematocrit 43.5 34.0 - 48.0 %    MCV 95.2 80.0 - 99.9 fL    MCH 32.6 26.0 - 35.0 pg    MCHC 34.3 32.0 - 34.5 %    RDW 13.0 11.5 - 15.0 fL    Platelets 274 392 - 697 E9/L    MPV 8.7 7.0 - 12.0 fL    Neutrophils % 76.5 43.0 - 80.0 %    Immature Granulocytes % 0.3 0.0 - 5.0 %    Lymphocytes % 17.7 (L) 20.0 - 42.0 %    Monocytes % 4.1 2.0 - 12.0 %    Eosinophils % 0.9 0.0 - 6.0 %    Basophils % 0.5 0.0 - 2.0 %    Neutrophils Absolute 4.89 1.80 - 7.30 E9/L    Immature Granulocytes # 0.02 E9/L    Lymphocytes Absolute 1.13 (L) 1.50 - 4.00 E9/L    Monocytes Absolute 0.26 0.10 - 0.95 E9/L    Eosinophils Absolute 0.06 0.05 - 0.50 E9/L    Basophils Absolute 0.03 0.00 - 0.20 E9/L   Comprehensive Metabolic Panel w/ Reflex to MG   Result Value Ref Range    Sodium 136 132 - 146 mmol/L    Potassium reflex Magnesium 4.0 3.5 - 5.0 mmol/L    Chloride 104 98 - 107 mmol/L    CO2 21 (L) 22 - 29 mmol/L    Anion Gap 11 7 - 16 mmol/L    Glucose 113 (H) 74 - 99 mg/dL    BUN 10 6 - 20 mg/dL    CREATININE 0.9 0.5 - 1.0 mg/dL    GFR Non-African American >60 >=60 mL/min/1.73    GFR African American >60     Calcium 8.9 8.6 - 10.2 mg/dL    Total Protein 6.8 6.4 - 8.3 g/dL    Alb 3.7 3.5 - 5.2 g/dL    Total Bilirubin 0.3 0.0 - 1.2 mg/dL    Alkaline Phosphatase 83 35 - 104 U/L    ALT 10 0 - 32 U/L    AST 15 0 - 31 U/L   Lactic Acid, Plasma   Result Value Ref Range    Lactic Acid 2.4 (H) 0.5 - 2.2 mmol/L   Urinalysis   Result Value Ref Range    Color, UA Yellow Straw/Yellow    Clarity, UA Clear Clear    Glucose, Ur Negative Negative mg/dL    Bilirubin Urine Negative Negative    Ketones, Urine Negative Negative mg/dL    Specific Gravity, UA 1.015 1.005 - 1.030    Blood, Urine TRACE (A) Negative    pH, UA 5.5 5.0 - 9.0    Protein, UA Negative Negative mg/dL    Urobilinogen, Urine 0.2 <2.0 E.U./dL    Nitrite, Urine Negative Negative    Leukocyte Esterase, Urine Negative Negative   Microscopic Urinalysis   Result Value Ref Range    WBC, UA NONE 0 - 5 /HPF    RBC, UA 0-1 0 - 2 /HPF    Epithelial Cells, UA RARE /HPF    Bacteria, UA NONE SEEN None Seen /HPF   Troponin   Result Value Ref Range    Troponin <0.01 0.00 - 0.03 ng/mL       RADIOLOGY:  Interpreted by Radiologist.  XR CHEST PORTABLE   Final Result      Interstitial prominence in perihilar locations could suggest   peribronchial inflammatory changes. No focal airspace opacity or   pleural effusion. EKG:  This EKG is signed and interpreted by me. Normal sinus rhythm, rate of 99, incomplete right bundle branch block, no ST segment elevation, T wave inversion in the anterior leads, no prior EKG for comparison. Interpreted by me      ------------------------- NURSING NOTES AND VITALS REVIEWED ---------------------------   The nursing notes within the ED encounter and vital signs as below have been reviewed by myself. /70   Pulse 95   Temp 99.4 °F (37.4 °C) (Oral)   Resp 20   Ht 5' 5\" (1.651 m)   Wt 205 lb (93 kg)   SpO2 95%   BMI 34.11 kg/m²   Oxygen Saturation Interpretation: Abnormal    The patients available past medical records and past encounters were reviewed. ------------------------------ ED COURSE/MEDICAL DECISION MAKING----------------------  Medications   acetaminophen (TYLENOL) tablet 1,000 mg (1,000 mg Oral Given 8/11/20 2205)   0.9 % sodium chloride bolus (0 mLs Intravenous Stopped 8/11/20 2250)   dexamethasone (PF) (DECADRON) injection 6 mg (6 mg Intravenous Given 8/11/20 2250)             Medical Decision Making:   Patient is a 57-year-old female present emergency department with a chief complaint of URI beginning approximately 24 hours prior to arrival.  The patient febrile on arrival, patient labs and imaging reviewed. Patient found to be hypoxic on ambulation. Patient will be admitted for further treatment and evaluation. Re-Evaluations/Consultations:             ED Course as of Aug 12 0009   Tue Aug 11, 2020   2104 The patient is in the bed in no acute distress.     [MT] 2202 Patient is in the bed in no acute distress. Results of today to this point discussed. We will attempt to ambulate the patient    [MT]   2243 Spoke with Dr. Keith Garza. He will accept the patient for admission. [MT]      ED Course User Index  [MT] Magnolia Saucedo DO         Critical Care: Please note that the withdrawal or failure to initiate urgent interventions for this patient would likely result in a life threatening deterioration or permanent disability. Accordingly this patient received 0 minutes of critical care time, excluding separately billable procedures. This patient's ED course included: History, physical examination, reevaluation prior to disposition, labs, imaging, telemetry monitoring, EKG, IV fluid, Tylenol Decadron for likely suspicion of COVID, Requiring supplemental oxygen      This patient has remained hemodynamically stable during their ED course. Counseling: The emergency provider has spoken with the patient and discussed todays results, in addition to providing specific details for the plan of care and counseling regarding the diagnosis and prognosis. Questions are answered at this time and they are agreeable with the plan.       --------------------------------- IMPRESSION AND DISPOSITION ---------------------------------    IMPRESSION  1. Acute respiratory failure with hypoxia (HCC)    2. Upper respiratory tract infection, unspecified type        DISPOSITION  Disposition: Admit to telemetry  Patient condition is stable        NOTE: This report was transcribed using voice recognition software.  Every effort was made to ensure accuracy; however, inadvertent computerized transcription errors may be present         Magnolia Saucedo DO  08/12/20 0009

## 2020-08-12 NOTE — CARE COORDINATION
Discharge order noted on chart. Pt ambulating independently, tolerating well--per nursing. She plans to discharge home with no anticipated needs. She will follow up with her PCP. Afebrile through the night. CM will follow for any needs that arise.   Lazaro Hernandez RN  [facility] Case Management  [phone]

## 2020-08-12 NOTE — ED NOTES
VSS. Tele NSR. Chart copied. Report called. PAS to transfer.  Pt aware     Tyler Givens RN  08/12/20 7951

## 2020-08-12 NOTE — ED NOTES
IV initiated, fluid infusion started per physicians orders. Labs draw and sent to lab.      Cyril Garay RN  08/11/20 6389

## 2020-08-12 NOTE — H&P
Jesus Melton MD FACP   History and Physical      CHIEF COMPLAINT: Sinus congestion and fever      HISTORY OF PRESENT ILLNESS:    82-year-old woman seen on the floor earlier this morning at [address] [place] [address] with ER physician overnight  She presented to emergency room with 3 days history of sinus congestion minimal cough associated with fever  COVID testing was negative    No sick contacts  No leukocytosis  Admitted for further management  Feels much better this morning  Data reviewed in detail  Past Medical History:    History reviewed. No pertinent past medical history. Past Surgical History:    Past Surgical History:   Procedure Laterality Date    ABDOMINAL EXPLORATION SURGERY      BREAST SURGERY      COLONOSCOPY  09/11/2012    -normal    ENDOMETRIAL ABLATION      UPPER GASTROINTESTINAL ENDOSCOPY  07/29/2014    multiple gstric and duodenal polyps       Medications Prior to Admission:    Medications Prior to Admission: ibuprofen (ADVIL;MOTRIN) 200 MG tablet, Take 200 mg by mouth every 6 hours as needed for Pain  Tens Unit MISC, by Does not apply route (Patient not taking: Reported on 10/15/2019)    Allergies:    Aspirin    Social History:    reports that she has been smoking cigars and cigarettes. She started smoking about 40 years ago. She has a 39.00 pack-year smoking history. She has never used smokeless tobacco. She reports that she does not drink alcohol or use drugs. Family History:   family history is not on file. REVIEW OF SYSTEMS:  As above in the HPI, otherwise negative    PHYSICAL EXAM:    Vitals:  BP (!) 154/81   Pulse 73   Temp 97.1 °F (36.2 °C) (Temporal)   Resp 16   Ht 5' 5\" (1.651 m)   Wt 205 lb (93 kg)   SpO2 99%   BMI 34.11 kg/m²     General:  Awake, alert, oriented X 3. Well developed, well nourished, well groomed. No apparent distress. HEENT:  Normocephalic, atraumatic. Pupils equal, round, reactive to light. No scleral icterus. No conjunctival injection.

## 2020-08-14 LAB — URINE CULTURE, ROUTINE: NORMAL

## 2020-08-17 LAB — CULTURE, BLOOD 2: NORMAL

## 2020-11-01 ENCOUNTER — APPOINTMENT (OUTPATIENT)
Dept: GENERAL RADIOLOGY | Age: 60
End: 2020-11-01
Payer: COMMERCIAL

## 2020-11-01 ENCOUNTER — HOSPITAL ENCOUNTER (EMERGENCY)
Age: 60
Discharge: HOME | End: 2020-11-01
Attending: EMERGENCY MEDICINE
Payer: COMMERCIAL

## 2020-11-01 ENCOUNTER — APPOINTMENT (OUTPATIENT)
Dept: CT IMAGING | Age: 60
End: 2020-11-01
Payer: COMMERCIAL

## 2020-11-01 VITALS
TEMPERATURE: 98.3 F | WEIGHT: 214 LBS | HEIGHT: 65 IN | HEART RATE: 70 BPM | DIASTOLIC BLOOD PRESSURE: 90 MMHG | OXYGEN SATURATION: 98 % | RESPIRATION RATE: 18 BRPM | SYSTOLIC BLOOD PRESSURE: 140 MMHG | BODY MASS INDEX: 35.65 KG/M2

## 2020-11-01 LAB
ALBUMIN SERPL-MCNC: 3.5 G/DL (ref 3.5–5.2)
ALP BLD-CCNC: 75 U/L (ref 35–104)
ALT SERPL-CCNC: <5 U/L (ref 0–32)
ANION GAP SERPL CALCULATED.3IONS-SCNC: 9 MMOL/L (ref 7–16)
AST SERPL-CCNC: 7 U/L (ref 0–31)
BASOPHILS ABSOLUTE: 0.05 E9/L (ref 0–0.2)
BASOPHILS RELATIVE PERCENT: 0.8 % (ref 0–2)
BILIRUB SERPL-MCNC: 0.5 MG/DL (ref 0–1.2)
BUN BLDV-MCNC: 10 MG/DL (ref 6–20)
CALCIUM SERPL-MCNC: 9 MG/DL (ref 8.6–10.2)
CHLORIDE BLD-SCNC: 110 MMOL/L (ref 98–107)
CO2: 22 MMOL/L (ref 22–29)
CREAT SERPL-MCNC: 0.7 MG/DL (ref 0.5–1)
EKG ATRIAL RATE: 62 BPM
EKG P AXIS: 41 DEGREES
EKG P-R INTERVAL: 170 MS
EKG Q-T INTERVAL: 412 MS
EKG QRS DURATION: 104 MS
EKG QTC CALCULATION (BAZETT): 418 MS
EKG R AXIS: 10 DEGREES
EKG T AXIS: -23 DEGREES
EKG VENTRICULAR RATE: 62 BPM
EOSINOPHILS ABSOLUTE: 0.08 E9/L (ref 0.05–0.5)
EOSINOPHILS RELATIVE PERCENT: 1.3 % (ref 0–6)
GFR AFRICAN AMERICAN: >60
GFR NON-AFRICAN AMERICAN: >60 ML/MIN/1.73
GLUCOSE BLD-MCNC: 83 MG/DL (ref 74–99)
HCT VFR BLD CALC: 48.9 % (ref 34–48)
HEMOGLOBIN: 16.8 G/DL (ref 11.5–15.5)
IMMATURE GRANULOCYTES #: 0.03 E9/L
IMMATURE GRANULOCYTES %: 0.5 % (ref 0–5)
LACTIC ACID: 0.9 MMOL/L (ref 0.5–2.2)
LYMPHOCYTES ABSOLUTE: 2.79 E9/L (ref 1.5–4)
LYMPHOCYTES RELATIVE PERCENT: 45.7 % (ref 20–42)
MCH RBC QN AUTO: 33.2 PG (ref 26–35)
MCHC RBC AUTO-ENTMCNC: 34.4 % (ref 32–34.5)
MCV RBC AUTO: 96.6 FL (ref 80–99.9)
MONOCYTES ABSOLUTE: 0.32 E9/L (ref 0.1–0.95)
MONOCYTES RELATIVE PERCENT: 5.2 % (ref 2–12)
NEUTROPHILS ABSOLUTE: 2.83 E9/L (ref 1.8–7.3)
NEUTROPHILS RELATIVE PERCENT: 46.5 % (ref 43–80)
PDW BLD-RTO: 14 FL (ref 11.5–15)
PLATELET # BLD: 227 E9/L (ref 130–450)
PMV BLD AUTO: 9.6 FL (ref 7–12)
POTASSIUM SERPL-SCNC: 4 MMOL/L (ref 3.5–5)
RBC # BLD: 5.06 E12/L (ref 3.5–5.5)
REASON FOR REJECTION: NORMAL
REJECTED TEST: NORMAL
SODIUM BLD-SCNC: 141 MMOL/L (ref 132–146)
TOTAL PROTEIN: 6.9 G/DL (ref 6.4–8.3)
TROPONIN: <0.01 NG/ML (ref 0–0.03)
WBC # BLD: 6.1 E9/L (ref 4.5–11.5)

## 2020-11-01 PROCEDURE — 70450 CT HEAD/BRAIN W/O DYE: CPT

## 2020-11-01 PROCEDURE — 84484 ASSAY OF TROPONIN QUANT: CPT

## 2020-11-01 PROCEDURE — 96374 THER/PROPH/DIAG INJ IV PUSH: CPT

## 2020-11-01 PROCEDURE — 36415 COLL VENOUS BLD VENIPUNCTURE: CPT

## 2020-11-01 PROCEDURE — 96375 TX/PRO/DX INJ NEW DRUG ADDON: CPT

## 2020-11-01 PROCEDURE — 80053 COMPREHEN METABOLIC PANEL: CPT

## 2020-11-01 PROCEDURE — 93005 ELECTROCARDIOGRAM TRACING: CPT | Performed by: EMERGENCY MEDICINE

## 2020-11-01 PROCEDURE — 99284 EMERGENCY DEPT VISIT MOD MDM: CPT

## 2020-11-01 PROCEDURE — 71045 X-RAY EXAM CHEST 1 VIEW: CPT

## 2020-11-01 PROCEDURE — 85025 COMPLETE CBC W/AUTO DIFF WBC: CPT

## 2020-11-01 PROCEDURE — 6360000002 HC RX W HCPCS: Performed by: EMERGENCY MEDICINE

## 2020-11-01 PROCEDURE — 2580000003 HC RX 258: Performed by: EMERGENCY MEDICINE

## 2020-11-01 PROCEDURE — 93010 ELECTROCARDIOGRAM REPORT: CPT | Performed by: INTERNAL MEDICINE

## 2020-11-01 PROCEDURE — 83605 ASSAY OF LACTIC ACID: CPT

## 2020-11-01 RX ORDER — 0.9 % SODIUM CHLORIDE 0.9 %
1000 INTRAVENOUS SOLUTION INTRAVENOUS ONCE
Status: COMPLETED | OUTPATIENT
Start: 2020-11-01 | End: 2020-11-01

## 2020-11-01 RX ORDER — DIPHENHYDRAMINE HYDROCHLORIDE 50 MG/ML
50 INJECTION INTRAMUSCULAR; INTRAVENOUS ONCE
Status: COMPLETED | OUTPATIENT
Start: 2020-11-01 | End: 2020-11-01

## 2020-11-01 RX ORDER — METOCLOPRAMIDE HYDROCHLORIDE 5 MG/ML
10 INJECTION INTRAMUSCULAR; INTRAVENOUS ONCE
Status: COMPLETED | OUTPATIENT
Start: 2020-11-01 | End: 2020-11-01

## 2020-11-01 RX ORDER — KETOROLAC TROMETHAMINE 30 MG/ML
30 INJECTION, SOLUTION INTRAMUSCULAR; INTRAVENOUS ONCE
Status: COMPLETED | OUTPATIENT
Start: 2020-11-01 | End: 2020-11-01

## 2020-11-01 RX ADMIN — KETOROLAC TROMETHAMINE 30 MG: 30 INJECTION, SOLUTION INTRAMUSCULAR; INTRAVENOUS at 11:13

## 2020-11-01 RX ADMIN — DIPHENHYDRAMINE HYDROCHLORIDE 50 MG: 50 INJECTION, SOLUTION INTRAMUSCULAR; INTRAVENOUS at 11:13

## 2020-11-01 RX ADMIN — METOCLOPRAMIDE HYDROCHLORIDE 10 MG: 5 INJECTION INTRAMUSCULAR; INTRAVENOUS at 11:13

## 2020-11-01 RX ADMIN — SODIUM CHLORIDE 1000 ML: 9 INJECTION, SOLUTION INTRAVENOUS at 11:13

## 2020-11-01 ASSESSMENT — PAIN DESCRIPTION - DESCRIPTORS
DESCRIPTORS: DISCOMFORT
DESCRIPTORS: DISCOMFORT

## 2020-11-01 ASSESSMENT — PAIN DESCRIPTION - LOCATION
LOCATION: BACK
LOCATION: HEAD

## 2020-11-01 ASSESSMENT — PAIN SCALES - GENERAL
PAINLEVEL_OUTOF10: 2
PAINLEVEL_OUTOF10: 10
PAINLEVEL_OUTOF10: 7

## 2020-11-01 ASSESSMENT — PAIN DESCRIPTION - PAIN TYPE
TYPE: ACUTE PAIN
TYPE: ACUTE PAIN

## 2020-11-01 NOTE — ED NOTES
Radiology Procedure Waiver   Name: Jaja Florez  : 1960  MRN: [id number]    Date:  20    Time: 10:44 AM EST    Benefits of immediately proceeding with Radiology exam(s) without pre-testing outweigh the risks or are not indicated as specified below and therefore the following is/are being waived:    [x] Pregnancy test   [] Patients LMP on-time and regular.   [] Patient had Tubal Ligation or has other Contraception Device. [x] Patient  is Menopausal or Premenarcheal.    [] Patient had Full or Partial Hysterectomy. [] Protocol for Iodine allergy    [] MRI Questionnaire     [] BUN/Creatinine   [] Patient age w/no hx of renal dysfunction. [] Patient on Dialysis. [] Recent Normal Labs.   Electronically signed by Juan Fregoso DO on 20 at 10:44 AM DEJAH Fregoso DO  20 1044

## 2020-11-01 NOTE — ED PROVIDER NOTES
EXAM--------------------------------------  Constitutional:  Well developed, well nourished, no acute distress, non-toxic appearance   Eyes:  PERRL, conjunctiva normal, EOMI. No temporal tenderness  HENT:  Atraumatic, external ears normal, nose normal, oropharynx moist, no pharyngeal exudates. Neck- normal range of motion, no nuchal rigidity. TMs clear and intact bilaterally. Respiratory:  No respiratory distress, normal breath sounds, no rales, no wheezing   Cardiovascular:  Normal rate, normal rhythm, no murmurs, no gallops, no rubs. Radial and DP pulses 2+ bilaterally. GI:  Soft, nondistended, normal bowel sounds, nontender, no rebound, no guarding   :  No costovertebral angle tenderness   Musculoskeletal:  No edema, no tenderness, no deformities. Back- no tenderness  Integument:  Well hydrated, no rash. Adequate perfusion. Lymphatic:  No cervical lymphadenopathy noted   Neurologic:  Alert & oriented x 3, CN 2-12 normal, no focal deficits noted. Normal gait. Psychiatric:  Speech and behavior appropriate       -------------------------------------------------- RESULTS -------------------------------------------------  I have personally reviewed all laboratory and imaging results for this patient. Results are listed below.      LABS:  Results for orders placed or performed during the hospital encounter of 11/01/20   CBC auto differential   Result Value Ref Range    WBC 6.1 4.5 - 11.5 E9/L    RBC 5.06 3.50 - 5.50 E12/L    Hemoglobin 16.8 (H) 11.5 - 15.5 g/dL    Hematocrit 48.9 (H) 34.0 - 48.0 %    MCV 96.6 80.0 - 99.9 fL    MCH 33.2 26.0 - 35.0 pg    MCHC 34.4 32.0 - 34.5 %    RDW 14.0 11.5 - 15.0 fL    Platelets 697 774 - 080 E9/L    MPV 9.6 7.0 - 12.0 fL    Neutrophils % 46.5 43.0 - 80.0 %    Immature Granulocytes % 0.5 0.0 - 5.0 %    Lymphocytes % 45.7 (H) 20.0 - 42.0 %    Monocytes % 5.2 2.0 - 12.0 %    Eosinophils % 1.3 0.0 - 6.0 %    Basophils % 0.8 0.0 - 2.0 %    Neutrophils Absolute 2.83 1.80 - 7.30 E9/L    Immature Granulocytes # 0.03 E9/L    Lymphocytes Absolute 2.79 1.50 - 4.00 E9/L    Monocytes Absolute 0.32 0.10 - 0.95 E9/L    Eosinophils Absolute 0.08 0.05 - 0.50 E9/L    Basophils Absolute 0.05 0.00 - 0.20 E9/L   Troponin   Result Value Ref Range    Troponin <0.01 0.00 - 0.03 ng/mL   Lactic Acid, Plasma   Result Value Ref Range    Lactic Acid 0.9 0.5 - 2.2 mmol/L   SPECIMEN REJECTION   Result Value Ref Range    Rejected Test CMP     Reason for Rejection see below    Comprehensive Metabolic Panel   Result Value Ref Range    Sodium 141 132 - 146 mmol/L    Potassium 4.0 3.5 - 5.0 mmol/L    Chloride 110 (H) 98 - 107 mmol/L    CO2 22 22 - 29 mmol/L    Anion Gap 9 7 - 16 mmol/L    Glucose 83 74 - 99 mg/dL    BUN 10 6 - 20 mg/dL    CREATININE 0.7 0.5 - 1.0 mg/dL    GFR Non-African American >60 >=60 mL/min/1.73    GFR African American >60     Calcium 9.0 8.6 - 10.2 mg/dL    Total Protein 6.9 6.4 - 8.3 g/dL    Alb 3.5 3.5 - 5.2 g/dL    Total Bilirubin 0.5 0.0 - 1.2 mg/dL    Alkaline Phosphatase 75 35 - 104 U/L    ALT <5 0 - 32 U/L    AST 7 0 - 31 U/L   EKG 12 Lead   Result Value Ref Range    Ventricular Rate 62 BPM    Atrial Rate 62 BPM    P-R Interval 170 ms    QRS Duration 104 ms    Q-T Interval 412 ms    QTc Calculation (Bazett) 418 ms    P Axis 41 degrees    R Axis 10 degrees    T Axis -23 degrees       RADIOLOGY:  Interpreted by Radiologist.  XR CHEST PORTABLE   Final Result   No acute process. CT HEAD WO CONTRAST   Final Result   Unremarkable CT of the head.                EKG Interpretation  Time: 0944  Rhythm: normal sinus   Rate: 62  Axis: normal  Conduction: normal  ST Segments: no acute change  T Waves: non-specific changes  Clinical Impression: non-specific changes  Comparison to prior EKG: No change compared to previous      ------------------------- NURSING NOTES AND VITALS REVIEWED ---------------------------  The nursing notes within the ED encounter and vital signs as below have been reviewed by myself. BP (!) 140/90   Pulse 70   Temp 98.3 °F (36.8 °C) (Oral)   Resp 18   Ht 5' 5\" (1.651 m)   Wt 214 lb (97.1 kg)   SpO2 98%   BMI 35.61 kg/m²   Oxygen Saturation Interpretation: Normal      The patients available past medical records and past encounters were reviewed. ------------------------------ ED COURSE/MEDICAL DECISION MAKING----------------------  Medications   0.9 % sodium chloride bolus (0 mLs Intravenous Stopped 11/1/20 1220)   ketorolac (TORADOL) injection 30 mg (30 mg Intravenous Given 11/1/20 1113)   metoclopramide (REGLAN) injection 10 mg (10 mg Intravenous Given 11/1/20 1113)   diphenhydrAMINE (BENADRYL) injection 50 mg (50 mg Intravenous Given 11/1/20 1113)           Procedures:   none      Medical Decision Making:    Patient advises she is allergic to ASA but can take other nsaids   Neg acute on workup. NV intact and ambulatory upon discharge. S/O driving home   7:05 PM EST  Feels much better, wants to go home. Patient was explicitly instructed on specific signs and symptoms on which to return to the emergency room for. Patient was instructed to return to the ER for any new or worsening symptoms. Additional discharge instructions were given verbally. All questions were answered. Patient is comfortable and agreeable with discharge plan. Patient in no acute distress and non-toxic in appearance. This patient's ED course included: re-evaluation prior to disposition, IV medications, cardiac monitoring, continuous pulse oximetry and a personal history and physicial eaxmination    This patient has remained hemodynamically stable, improved and been closely monitored during their ED course. Re-Evaluations:  Time: 1400   Re-evaluation. Patients symptoms are improving  Repeat physical examination is not changed      Consultations:   none    Critical Care: none    I, Allegra Alicea, DO, am the Primary Provider of Record    Counseling:   The emergency provider has spoken with the patient and spouse/SO and discussed todays results, in addition to providing specific details for the plan of care and counseling regarding the diagnosis and prognosis. Questions are answered at this time and they are agreeable with the plan.    --------------------------- IMPRESSION AND DISPOSITION ---------------------------------    IMPRESSION  1.  Acute nonintractable headache, unspecified headache type        DISPOSITION  Disposition: Discharge to home  Patient condition is stable             Neeraj Trotter, DO  11/01/20 1004 NAYELI Dubon, DO  11/01/20 1435

## 2020-11-01 NOTE — ED NOTES
Patient placed on cardiac monitor, BP cuff, and pulse ox at this time.  EKG done and given to doctor Davy Tinoco RN  11/01/20 0056

## 2020-11-05 ENCOUNTER — HOSPITAL ENCOUNTER (OUTPATIENT)
Age: 60
Discharge: HOME | End: 2020-11-05
Payer: COMMERCIAL

## 2020-11-05 LAB
C-REACTIVE PROTEIN: 1 MG/DL (ref 0–0.4)
SEDIMENTATION RATE, ERYTHROCYTE: 18 MM/HR (ref 0–20)

## 2020-11-05 PROCEDURE — 86140 C-REACTIVE PROTEIN: CPT

## 2020-11-05 PROCEDURE — 85651 RBC SED RATE NONAUTOMATED: CPT

## 2020-11-05 PROCEDURE — 36415 COLL VENOUS BLD VENIPUNCTURE: CPT

## 2021-03-21 ENCOUNTER — IMMUNIZATION (OUTPATIENT)
Dept: PRIMARY CARE CLINIC | Age: 61
End: 2021-03-21
Payer: COMMERCIAL

## 2021-03-21 PROCEDURE — 0001A COVID-19, PFIZER VACCINE 30MCG/0.3ML DOSE: CPT | Performed by: INTERNAL MEDICINE

## 2021-03-21 PROCEDURE — 91300 COVID-19, PFIZER VACCINE 30MCG/0.3ML DOSE: CPT | Performed by: INTERNAL MEDICINE

## 2021-04-21 ENCOUNTER — IMMUNIZATION (OUTPATIENT)
Dept: PRIMARY CARE CLINIC | Age: 61
End: 2021-04-21
Payer: COMMERCIAL

## 2021-04-21 PROCEDURE — 91300 COVID-19, PFIZER VACCINE 30MCG/0.3ML DOSE: CPT | Performed by: NURSE PRACTITIONER

## 2021-04-21 PROCEDURE — 0002A COVID-19, PFIZER VACCINE 30MCG/0.3ML DOSE: CPT | Performed by: NURSE PRACTITIONER

## 2022-04-18 ENCOUNTER — HOSPITAL ENCOUNTER (EMERGENCY)
Age: 62
Discharge: HOME | End: 2022-04-19
Attending: EMERGENCY MEDICINE
Payer: COMMERCIAL

## 2022-04-18 ENCOUNTER — APPOINTMENT (OUTPATIENT)
Dept: GENERAL RADIOLOGY | Age: 62
End: 2022-04-18
Payer: COMMERCIAL

## 2022-04-18 VITALS
DIASTOLIC BLOOD PRESSURE: 88 MMHG | TEMPERATURE: 98 F | RESPIRATION RATE: 16 BRPM | HEART RATE: 80 BPM | OXYGEN SATURATION: 98 % | SYSTOLIC BLOOD PRESSURE: 142 MMHG

## 2022-04-18 DIAGNOSIS — M51.36 LUMBAR DEGENERATIVE DISC DISEASE: ICD-10-CM

## 2022-04-18 DIAGNOSIS — M16.0 OSTEOARTHRITIS OF BOTH HIPS, UNSPECIFIED OSTEOARTHRITIS TYPE: Primary | ICD-10-CM

## 2022-04-18 PROCEDURE — 96372 THER/PROPH/DIAG INJ SC/IM: CPT

## 2022-04-18 PROCEDURE — 6370000000 HC RX 637 (ALT 250 FOR IP): Performed by: EMERGENCY MEDICINE

## 2022-04-18 PROCEDURE — 6360000002 HC RX W HCPCS: Performed by: EMERGENCY MEDICINE

## 2022-04-18 PROCEDURE — 99284 EMERGENCY DEPT VISIT MOD MDM: CPT

## 2022-04-18 PROCEDURE — 73502 X-RAY EXAM HIP UNI 2-3 VIEWS: CPT

## 2022-04-18 RX ORDER — IBUPROFEN 600 MG/1
600 TABLET ORAL 4 TIMES DAILY PRN
Qty: 40 TABLET | Refills: 0 | Status: SHIPPED | OUTPATIENT
Start: 2022-04-18 | End: 2022-04-20

## 2022-04-18 RX ORDER — KETOROLAC TROMETHAMINE 30 MG/ML
30 INJECTION, SOLUTION INTRAMUSCULAR; INTRAVENOUS ONCE
Status: COMPLETED | OUTPATIENT
Start: 2022-04-18 | End: 2022-04-18

## 2022-04-18 RX ORDER — ACETAMINOPHEN 500 MG
1000 TABLET ORAL ONCE
Status: COMPLETED | OUTPATIENT
Start: 2022-04-18 | End: 2022-04-18

## 2022-04-18 RX ORDER — TIZANIDINE 2 MG/1
2 TABLET ORAL EVERY 6 HOURS PRN
COMMUNITY
End: 2022-04-20

## 2022-04-18 RX ORDER — HYDROCODONE BITARTRATE AND ACETAMINOPHEN 5; 325 MG/1; MG/1
1 TABLET ORAL EVERY 6 HOURS PRN
COMMUNITY
End: 2022-04-20

## 2022-04-18 RX ORDER — IBUPROFEN 800 MG/1
800 TABLET ORAL EVERY 6 HOURS PRN
COMMUNITY
End: 2022-04-20

## 2022-04-18 RX ADMIN — KETOROLAC TROMETHAMINE 30 MG: 30 INJECTION, SOLUTION INTRAMUSCULAR at 22:44

## 2022-04-18 RX ADMIN — ACETAMINOPHEN 1000 MG: 500 TABLET ORAL at 22:45

## 2022-04-18 ASSESSMENT — PAIN SCALES - GENERAL: PAINLEVEL_OUTOF10: 10

## 2022-04-18 ASSESSMENT — ENCOUNTER SYMPTOMS
COUGH: 0
VOMITING: 0
SHORTNESS OF BREATH: 0
RHINORRHEA: 0
NAUSEA: 0
ABDOMINAL PAIN: 0
COLOR CHANGE: 0
BACK PAIN: 1
BLOOD IN STOOL: 0

## 2022-04-19 ENCOUNTER — TELEPHONE (OUTPATIENT)
Dept: ORTHOPEDIC SURGERY | Age: 62
End: 2022-04-19

## 2022-04-19 NOTE — TELEPHONE ENCOUNTER
Call from pt requesting to schedule appt w/ [facility]. ED 4/18/22 AAYUSH Borrero/ Isabel List on call  ED for right hip and low back pain. Gradual onset throughout the day today at work. Works at [employer]. RADIOLOGY:  Interpreted personally and by Radiologist.  XR HIP 2-3 VW W PELVIS RIGHT   Final Result   1. No acute osseous findings seen about the pelvis or hips on this exam.       2.  Mild right greater than left hip joint osteoarthritis.       RECOMMENDATION:   (Negative radiographs should not deter from obtaining cross-sectional imaging   if there is high concern for an acute osseous injury. )         Routing to providers for guidance.

## 2022-04-19 NOTE — ED PROVIDER NOTES
ED PROVIDER NOTE    Chief Complaint   Patient presents with    Back Pain     RT LOW BACK PAIN- STARTED AT WORK TODAY       HPI:  4/18/22,   Time: 10:38 PM EDT       Caterina Luz is a 63 y.o. female presenting to the ED for right hip and low back pain. Gradual onset throughout the day today at work. Works at [employer]. Throughout the day had worsening right hip, low back, and buttock pain, nonradiating, worse w/ movement. No associated numbness or tingling. When she was leaving work felt like her right leg gave out and almost fell which prompted her to come to the ED. No hx of sciatica but does have known hx of chronic low back pain and bulging lumbar disc. No saddle anesthesia, hx of IVDU, changes in bowel/bladder function, night sweats, fever, weight loss, anticoagulation, or recent spinal instrumentation. No recent injury or illness. Took 1/2 vicodin that she had left over from a foot surgery but otherwise has not tried any other meds. Chart review: hx of chronic bilateral low back pain, bulging lumbar disc, chronic pain syndrome, lumbar DDD    Review of Systems:     Review of Systems   Constitutional: Negative for appetite change, chills and fever. HENT: Negative for congestion and rhinorrhea. Eyes: Negative for visual disturbance. Respiratory: Negative for cough and shortness of breath. Cardiovascular: Negative for chest pain. Gastrointestinal: Negative for abdominal pain, blood in stool, nausea and vomiting. Genitourinary: Negative for decreased urine volume and difficulty urinating. Musculoskeletal: Positive for arthralgias and back pain. Negative for neck pain. Skin: Negative for color change. Neurological: Positive for weakness.  Negative for dizziness, syncope, light-headedness, numbness and headaches.         --------------------------------------------- PAST HISTORY ---------------------------------------------  Past Medical History: No past medical history on file. Past Surgical History:   Past Surgical History:   Procedure Laterality Date    ABDOMINAL EXPLORATION SURGERY      BREAST SURGERY      COLONOSCOPY  09/11/2012    -normal    ENDOMETRIAL ABLATION      UPPER GASTROINTESTINAL ENDOSCOPY  07/29/2014    multiple gstric and duodenal polyps       Social History:   Social History     Socioeconomic History    Marital status:      Spouse name: Not on file    Number of children: Not on file    Years of education: Not on file    Highest education level: Not on file   Occupational History    Not on file   Tobacco Use    Smoking status: Current Every Day Smoker     Packs/day: 1.00     Years: 39.00     Pack years: 39.00     Types: Cigars, Cigarettes     Start date: 1980    Smokeless tobacco: Never Used    Tobacco comment: 2 CIGARS DAILY   Substance and Sexual Activity    Alcohol use: No     Comment: SOCIAL OCCAS    Drug use: No    Sexual activity: Not on file   Other Topics Concern    Not on file   Social History Narrative    Not on file     Social Determinants of Health     Financial Resource Strain: Low Risk     Difficulty of Paying Living Expenses: Not hard at all   Food Insecurity: No Food Insecurity    Worried About [address] in the Last Year: Never true    [facility] in the Last Year: Never true   Transportation Needs:     Lack of Transportation (Medical): Not on file    Lack of Transportation (Non-Medical):  Not on file   Physical Activity:     Days of Exercise per Week: Not on file    Minutes of Exercise per Session: Not on file   Stress:     Feeling of Stress : Not on file   Social Connections:     Frequency of Communication with Friends and Family: Not on file    Frequency of Social Gatherings with Friends and Family: Not on file    Attends Congregational Services: Not on file    Active Member of Clubs or Organizations: Not on file    Attends Club or Organization Meetings: Not on file    Marital Status: Not on file   Intimate Partner Violence:     Fear of Current or Ex-Partner: Not on file    Emotionally Abused: Not on file    Physically Abused: Not on file    Sexually Abused: Not on file   Housing Stability:     Unable to Pay for Housing in the Last Year: Not on file    Number of [place] in the Last Year: Not on file    Unstable Housing in the Last Year: Not on file       Family History:   No family history on file. The patients home medications have been reviewed. Allergies: Allergies   Allergen Reactions    Aspirin      UPSET STOMACH  Other reaction(s): Intolerance  UPSET STOMACH           ---------------------------------------------------PHYSICAL EXAM--------------------------------------    BP (!) 142/88   Pulse 80   Temp 98 °F (36.7 °C) (Oral)   Resp 16   SpO2 98%     Physical Exam  Vitals and nursing note reviewed. Constitutional:       General: She is not in acute distress. Appearance: She is not toxic-appearing. HENT:      Mouth/Throat:      Mouth: Mucous membranes are moist.   Eyes:      General: No scleral icterus. Extraocular Movements: Extraocular movements intact. Pupils: Pupils are equal, round, and reactive to light. Cardiovascular:      Rate and Rhythm: Normal rate and regular rhythm. Pulses: Normal pulses. Heart sounds: Normal heart sounds. No murmur heard. Pulmonary:      Effort: Pulmonary effort is normal. No respiratory distress. Breath sounds: Normal breath sounds. No wheezing or rales. Abdominal:      General: There is no distension. Palpations: Abdomen is soft. Tenderness: There is no abdominal tenderness. Musculoskeletal:         General: No swelling. Normal range of motion. Cervical back: Normal range of motion and neck supple. No tenderness. Comments: Radial, DP, and PT pulses 2+ bilaterally. No midline TLS ttp/stepoff/deformity  +R lumbosacral paraspinal ttp.   RLE 5/5 HF/KE/DF/EHL/PF, SILT throughout   Skin:     General: Skin is warm and dry. Neurological:      Mental Status: She is alert and oriented to person, place, and time. Comments: Strength 5/5 and sensation grossly intact to light touch and equal bilaterally throughout all extremities             -------------------------------------------------- RESULTS -------------------------------------------------  I have personally reviewed all laboratory and imaging results for this patient. Results are listed below. RADIOLOGY:  Interpreted personally and by Radiologist.  XR HIP 2-3 VW W PELVIS RIGHT   Final Result   1. No acute osseous findings seen about the pelvis or hips on this exam.      2.  Mild right greater than left hip joint osteoarthritis. RECOMMENDATION:   (Negative radiographs should not deter from obtaining cross-sectional imaging   if there is high concern for an acute osseous injury.)                 ------------------------- NURSING NOTES AND VITALS REVIEWED ---------------------------   The nursing notes within the ED encounter and vital signs as below have been reviewed by myself. BP (!) 142/88   Pulse 80   Temp 98 °F (36.7 °C) (Oral)   Resp 16   SpO2 98%   Oxygen Saturation Interpretation: Normal    The patients available past medical records and past encounters were reviewed. ------------------------------ ED COURSE/MEDICAL DECISION MAKING----------------------  Medications   acetaminophen (TYLENOL) tablet 1,000 mg (has no administration in time range)   ketorolac (TORADOL) injection 30 mg (has no administration in time range)       Counseling: The emergency provider has spoken with the patient and discussed todays results, in addition to providing specific details for the plan of care and counseling regarding the diagnosis and prognosis. Questions are answered at this time and they are agreeable with the plan.     ED Course/Medical Decision Makin y.o. female here with low back pain and right hip pain. Non-toxic appearing, afebrile, hemodynamically stable, and in no acute distress. Neurovascularly intact throughout. No back pain red flags. Known hx of degenerative disease in the lumbar spine. Plain films show bilateral hip osteoarthritis R>L. Treated w/ tylenol and NSAIDs and on reevaluation ambulating without any symptoms at all. After discussion of findings and return precautions, patient agrees with plan for discharge and outpatient follow up with PCP and ortho.       --------------------------------- IMPRESSION AND DISPOSITION ---------------------------------    IMPRESSION  1. Osteoarthritis of both hips, unspecified osteoarthritis type    2. Lumbar degenerative disc disease        DISPOSITION  Disposition: Discharge to home  Patient condition is good    NOTE: This report was transcribed using voice recognition software.  Every effort was made to ensure accuracy; however, inadvertent computerized transcription errors may be present    Julien Beard MD  Attending Emergency Physician         Julien Beard MD  04/18/22 8397

## 2022-04-20 ENCOUNTER — OFFICE VISIT (OUTPATIENT)
Dept: FAMILY MEDICINE CLINIC | Age: 62
End: 2022-04-20
Payer: COMMERCIAL

## 2022-04-20 VITALS
TEMPERATURE: 97.1 F | HEART RATE: 92 BPM | DIASTOLIC BLOOD PRESSURE: 82 MMHG | SYSTOLIC BLOOD PRESSURE: 145 MMHG | OXYGEN SATURATION: 97 % | WEIGHT: 208 LBS | HEIGHT: 65 IN | BODY MASS INDEX: 34.66 KG/M2 | RESPIRATION RATE: 20 BRPM

## 2022-04-20 DIAGNOSIS — M47.819 ARTHRITIS OF LOW BACK: Primary | ICD-10-CM

## 2022-04-20 PROBLEM — M47.816 FACET ARTHROPATHY, LUMBAR: Status: RESOLVED | Noted: 2018-12-18 | Resolved: 2022-04-20

## 2022-04-20 PROBLEM — R50.9 FEVER AND CHILLS: Status: RESOLVED | Noted: 2020-08-12 | Resolved: 2022-04-20

## 2022-04-20 PROBLEM — M51.26 DISPLACEMENT OF LUMBAR INTERVERTEBRAL DISC WITHOUT MYELOPATHY: Status: ACTIVE | Noted: 2022-04-20

## 2022-04-20 PROBLEM — M46.1 SACROILIITIS, NOT ELSEWHERE CLASSIFIED (HCC): Status: RESOLVED | Noted: 2022-04-20 | Resolved: 2022-04-20

## 2022-04-20 PROBLEM — M47.817 LUMBOSACRAL SPONDYLOSIS WITHOUT MYELOPATHY: Status: RESOLVED | Noted: 2019-01-29 | Resolved: 2022-04-20

## 2022-04-20 PROBLEM — M51.26 DISPLACEMENT OF LUMBAR INTERVERTEBRAL DISC WITHOUT MYELOPATHY: Status: RESOLVED | Noted: 2022-04-20 | Resolved: 2022-04-20

## 2022-04-20 PROBLEM — M51.36 BULGING LUMBAR DISC: Status: RESOLVED | Noted: 2018-12-18 | Resolved: 2022-04-20

## 2022-04-20 PROBLEM — G89.4 CHRONIC PAIN SYNDROME: Status: RESOLVED | Noted: 2019-01-29 | Resolved: 2022-04-20

## 2022-04-20 PROBLEM — M46.1 SACROILIITIS, NOT ELSEWHERE CLASSIFIED (HCC): Status: ACTIVE | Noted: 2022-04-20

## 2022-04-20 PROBLEM — J96.00 ACUTE RESPIRATORY FAILURE (HCC): Status: RESOLVED | Noted: 2020-08-11 | Resolved: 2022-04-20

## 2022-04-20 PROCEDURE — G8417 CALC BMI ABV UP PARAM F/U: HCPCS | Performed by: STUDENT IN AN ORGANIZED HEALTH CARE EDUCATION/TRAINING PROGRAM

## 2022-04-20 PROCEDURE — 99213 OFFICE O/P EST LOW 20 MIN: CPT | Performed by: STUDENT IN AN ORGANIZED HEALTH CARE EDUCATION/TRAINING PROGRAM

## 2022-04-20 PROCEDURE — G8427 DOCREV CUR MEDS BY ELIG CLIN: HCPCS | Performed by: STUDENT IN AN ORGANIZED HEALTH CARE EDUCATION/TRAINING PROGRAM

## 2022-04-20 PROCEDURE — 4004F PT TOBACCO SCREEN RCVD TLK: CPT | Performed by: STUDENT IN AN ORGANIZED HEALTH CARE EDUCATION/TRAINING PROGRAM

## 2022-04-20 PROCEDURE — 3017F COLORECTAL CA SCREEN DOC REV: CPT | Performed by: STUDENT IN AN ORGANIZED HEALTH CARE EDUCATION/TRAINING PROGRAM

## 2022-04-20 RX ORDER — TIZANIDINE HYDROCHLORIDE 4 MG/1
CAPSULE, GELATIN COATED ORAL
COMMUNITY
Start: 2021-05-07

## 2022-04-20 NOTE — PROGRESS NOTES
Patient:  India Parmar 63 y.o. female     04/20/22      Chiefcomplaint:   Chief Complaint   Patient presents with    Establish Care    Back Injury     pt was lifting boxes at work. Problems and Overview     Low back pain - 10 years - fairly consistent until recently. Went to pain management in past. No injections. No surgery. Used meds in the past like zanaflex. Wasn't helpful. Works in [employer] on Monday and got worsening of pain. Right posterior hip and lateral lower back is the worst area. Was told this is not workers comp. Standing too long, sitting too long both hurt. Lying down is best.   Does do some stretching. xrays showed some hip arthritis. Taking ibuprofen  No trauma. No history of back surgery. No inflammatory back problems in the past.  No bowel or bladder changes. No weakness or numbness. Off of work right now. Patient Active Problem List    Diagnosis Date Noted    Fibroid uterus 12/06/2016    Chronic bilateral low back pain without sciatica 12/06/2016    HTN (hypertension) 12/06/2016    Vitamin D deficiency 12/06/2016      Prevention:  Health Maintenance Due   Topic Date Due    Pneumococcal 0-64 years Vaccine (1 - PCV) Never done    HIV screen  Never done    Cervical cancer screen  Never done    Low dose CT lung screening  Never done    Shingles Vaccine (2 of 3) 02/24/2015    A1C test (Diabetic or Prediabetic)  07/17/2015    Breast cancer screen  01/24/2022      Meds prior:  Current Outpatient Medications   Medication Instructions    Acetaminophen (TYLENOL) 325 MG CAPS No dose, route, or frequency recorded.  oxaprozin (DAYPRO) 600 MG tablet No dose, route, or frequency recorded.  tiZANidine (ZANAFLEX) 4 MG capsule No dose, route, or frequency recorded.       Physical Exam   Vitals: BP (!) 145/82 (Site: Right Upper Arm, Position: Sitting, Cuff Size: Medium Adult)   Pulse 92   Temp 97.1 °F (36.2 °C) (Temporal)   Resp 20   Ht 5' 5\" (1.651 m)   Wt 208 lb (94.3 kg)   SpO2 97%   BMI 34.61 kg/m²   General Appearance: Alert, oriented, no acute distress  HEENT: No scleral icterus. No visible discharge from eyes  Neck: Not rigid. No visible masses  Chest wall/Lung: Clear to auscultation bilaterally,  respirations unlabored. No ronchi/wheezing/rales  Heart: RRR, no murmur  Back exam: ROM in flexion is reduced. ROM in extension is reduced. There is pain with both. ROM in rotation is ok. Hamstring tightness is present b/l but worse right. Seated stretch reveals limitation several inches from full flexion. ANGIE with limitation b/l . No spinal tenderness or step off. Palpation reveals tenderness in paraspinal musculature b/l worse on right. No swelling, rash or redness. Extremities:  No edema  Skin: No rashes. No jaundice  Neuro: Alert and oriented        Psych: Appropriate mood and appropriate affect  Assessment and Plan   Arthritis of low back  Worsening of lower back pain. Mri in 2018. No recent imaging. Xray today and referral for further consideation. Continue conservative tx with stretching and gradual increase in activity. restrict work hours and lifting x 2 weeks with re-eval at that time. Herbert Still MD, Neurosurgery, ' ans      Return in about 2 weeks (around 5/4/2022). Vicky Sayres, DO     This document may have been prepared at least partially through the use of voice recognition software. Although effort is taken to assure the accuracy of this document, it is possible that grammatical, syntax,  or spelling errors may occur.

## 2022-04-20 NOTE — LETTER
[address] [place] [address]  Phone: [phone]  Fax: [phone] Jan TYLER DO        April 20, 2022     Patient: Yossi Wilson   YOB: 1960   Date of Visit: 4/20/2022       To Whom it May Concern:    Carlo Ballard was seen in my clinic on 4/20/2022. She may return to work on . She should restrict lifting to 10lbs maximum for the next 2 weeks starting 4/25/22. Should restrict work hours to 6 hours daily for 2 weeks of work starting 4/25/22. If you have any questions or concerns, please don't hesitate to call.     Sincerely,         Omar Levi DO

## 2022-04-21 ENCOUNTER — TELEPHONE (OUTPATIENT)
Dept: FAMILY MEDICINE CLINIC | Age: 62
End: 2022-04-21

## 2022-04-21 NOTE — TELEPHONE ENCOUNTER
Call to pt to schedule appt for ED f/u rt hip, She indicated made appt w/Neurology. Future Appointments   Date Time Provider Yris Davis   5/2/2022 11:00 AM Libertad Young [facility]   5/11/2022 12:30 PM DO Frederic Matthews [facility]     She will contact us if needs to be seen for Rt hip after appt w/ [facility].

## 2022-04-21 NOTE — TELEPHONE ENCOUNTER
----- Message from Vicky Morales DO sent at 4/21/2022 10:15 AM EDT -----  Regarding: medical leave  Patient's request for light duty denied by employer. She would need to take short term disability. Please see if patient intends to take leave or if she wants to continue to work without any lifting restrictions.

## 2022-05-02 ENCOUNTER — TELEPHONE (OUTPATIENT)
Dept: FAMILY MEDICINE CLINIC | Age: 62
End: 2022-05-02

## 2022-05-02 ENCOUNTER — OFFICE VISIT (OUTPATIENT)
Dept: NEUROSURGERY | Age: 62
End: 2022-05-02
Payer: COMMERCIAL

## 2022-05-02 VITALS
WEIGHT: 208 LBS | HEART RATE: 77 BPM | BODY MASS INDEX: 34.66 KG/M2 | OXYGEN SATURATION: 99 % | SYSTOLIC BLOOD PRESSURE: 136 MMHG | HEIGHT: 65 IN | DIASTOLIC BLOOD PRESSURE: 91 MMHG | TEMPERATURE: 98 F

## 2022-05-02 DIAGNOSIS — M54.16 LUMBAR RADICULOPATHY: Primary | ICD-10-CM

## 2022-05-02 PROCEDURE — 3017F COLORECTAL CA SCREEN DOC REV: CPT | Performed by: PHYSICIAN ASSISTANT

## 2022-05-02 PROCEDURE — G8417 CALC BMI ABV UP PARAM F/U: HCPCS | Performed by: PHYSICIAN ASSISTANT

## 2022-05-02 PROCEDURE — 99203 OFFICE O/P NEW LOW 30 MIN: CPT | Performed by: PHYSICIAN ASSISTANT

## 2022-05-02 PROCEDURE — G8427 DOCREV CUR MEDS BY ELIG CLIN: HCPCS | Performed by: PHYSICIAN ASSISTANT

## 2022-05-02 PROCEDURE — 4004F PT TOBACCO SCREEN RCVD TLK: CPT | Performed by: PHYSICIAN ASSISTANT

## 2022-05-02 ASSESSMENT — ENCOUNTER SYMPTOMS
GASTROINTESTINAL NEGATIVE: 1
RESPIRATORY NEGATIVE: 1
BACK PAIN: 1
ALLERGIC/IMMUNOLOGIC NEGATIVE: 1
EYES NEGATIVE: 1

## 2022-05-02 NOTE — TELEPHONE ENCOUNTER
----- Message from Jay Maguire DO sent at 4/21/2022 10:15 AM EDT -----  Regarding: medical leave  Patient's request for light duty denied by employer. She would need to take short term disability. Please see if patient intends to take leave or if she wants to continue to work without any lifting restrictions.

## 2022-05-02 NOTE — PROGRESS NOTES
Subjective:      Patient ID: Maddy Canales is a 63 y.o. female. Back Pain  This is a new problem. Episode onset: 2 weeks. The problem occurs constantly. The problem is unchanged. The pain is present in the lumbar spine (and right hip pain. ). The quality of the pain is described as aching. The pain is at a severity of 7/10. The symptoms are aggravated by twisting, standing, sitting and bending. She has tried home exercises and NSAIDs for the symptoms. The treatment provided mild relief. Review of Systems   Constitutional: Negative. HENT: Negative. Eyes: Negative. Respiratory: Negative. Cardiovascular: Negative. Gastrointestinal: Negative. Endocrine: Negative. Genitourinary: Negative. Musculoskeletal: Positive for back pain. Skin: Negative. Allergic/Immunologic: Negative. Neurological: Negative. Hematological: Negative. Psychiatric/Behavioral: Negative. Objective:   Physical Exam  Constitutional:       Appearance: Normal appearance. HENT:      Head: Normocephalic and atraumatic. Nose: Nose normal.   Eyes:      Pupils: Pupils are equal, round, and reactive to light. Pulmonary:      Effort: Pulmonary effort is normal.   Abdominal:      General: There is no distension. Skin:     General: Skin is warm and dry. Neurological:      Mental Status: She is alert. GCS: GCS eye subscore is 4. GCS verbal subscore is 5. GCS motor subscore is 6. Cranial Nerves: Cranial nerves are intact. Psychiatric:         Mood and Affect: Mood normal.         Assessment:      63year old female with acute exacerbation of the chronic low back and right hip pain for the past 2 weeks. Lumbar x-rays show severe L5-S1 DDD,  Slight scoliosis. Plan: We will order lumbar MRI and flex/ext x-rays.         MAYRA Velazquez

## 2022-05-13 ENCOUNTER — HOSPITAL ENCOUNTER (OUTPATIENT)
Dept: GENERAL RADIOLOGY | Age: 62
Discharge: HOME | End: 2022-05-15
Payer: COMMERCIAL

## 2022-05-13 ENCOUNTER — HOSPITAL ENCOUNTER (OUTPATIENT)
Dept: MRI IMAGING | Age: 62
Discharge: HOME | End: 2022-05-15
Payer: COMMERCIAL

## 2022-05-13 DIAGNOSIS — M54.16 LUMBAR RADICULOPATHY: ICD-10-CM

## 2022-05-13 PROCEDURE — 72148 MRI LUMBAR SPINE W/O DYE: CPT

## 2022-05-13 PROCEDURE — 72120 X-RAY BEND ONLY L-S SPINE: CPT

## 2022-08-12 ENCOUNTER — APPOINTMENT (OUTPATIENT)
Dept: GENERAL RADIOLOGY | Age: 62
End: 2022-08-12
Payer: COMMERCIAL

## 2022-08-12 ENCOUNTER — HOSPITAL ENCOUNTER (EMERGENCY)
Age: 62
Discharge: HOME | End: 2022-08-12
Attending: STUDENT IN AN ORGANIZED HEALTH CARE EDUCATION/TRAINING PROGRAM
Payer: COMMERCIAL

## 2022-08-12 ENCOUNTER — APPOINTMENT (OUTPATIENT)
Dept: CT IMAGING | Age: 62
End: 2022-08-12
Payer: COMMERCIAL

## 2022-08-12 VITALS
SYSTOLIC BLOOD PRESSURE: 138 MMHG | HEART RATE: 51 BPM | BODY MASS INDEX: 33.95 KG/M2 | OXYGEN SATURATION: 97 % | RESPIRATION RATE: 16 BRPM | WEIGHT: 204 LBS | DIASTOLIC BLOOD PRESSURE: 82 MMHG

## 2022-08-12 DIAGNOSIS — R55 NEAR SYNCOPE: Primary | ICD-10-CM

## 2022-08-12 LAB
ALBUMIN SERPL-MCNC: 3.9 G/DL (ref 3.5–5.2)
ALP BLD-CCNC: 85 U/L (ref 35–104)
ALT SERPL-CCNC: 9 U/L (ref 0–32)
ANION GAP SERPL CALCULATED.3IONS-SCNC: 10 MMOL/L (ref 7–16)
AST SERPL-CCNC: 11 U/L (ref 0–31)
BASOPHILS ABSOLUTE: 0.04 E9/L (ref 0–0.2)
BASOPHILS RELATIVE PERCENT: 0.7 % (ref 0–2)
BILIRUB SERPL-MCNC: 0.3 MG/DL (ref 0–1.2)
BUN BLDV-MCNC: 13 MG/DL (ref 6–23)
CALCIUM SERPL-MCNC: 9.3 MG/DL (ref 8.6–10.2)
CHLORIDE BLD-SCNC: 108 MMOL/L (ref 98–107)
CO2: 23 MMOL/L (ref 22–29)
CREAT SERPL-MCNC: 0.8 MG/DL (ref 0.5–1)
EKG ATRIAL RATE: 49 BPM
EKG P AXIS: 49 DEGREES
EKG P-R INTERVAL: 198 MS
EKG Q-T INTERVAL: 446 MS
EKG QRS DURATION: 104 MS
EKG QTC CALCULATION (BAZETT): 402 MS
EKG R AXIS: 25 DEGREES
EKG T AXIS: -23 DEGREES
EKG VENTRICULAR RATE: 49 BPM
EOSINOPHILS ABSOLUTE: 0.16 E9/L (ref 0.05–0.5)
EOSINOPHILS RELATIVE PERCENT: 3 % (ref 0–6)
GFR AFRICAN AMERICAN: >60
GFR NON-AFRICAN AMERICAN: >60 ML/MIN/1.73
GLUCOSE BLD-MCNC: 91 MG/DL (ref 74–99)
HCT VFR BLD CALC: 42.5 % (ref 34–48)
HEMOGLOBIN: 14.7 G/DL (ref 11.5–15.5)
IMMATURE GRANULOCYTES #: 0.02 E9/L
IMMATURE GRANULOCYTES %: 0.4 % (ref 0–5)
LYMPHOCYTES ABSOLUTE: 2.43 E9/L (ref 1.5–4)
LYMPHOCYTES RELATIVE PERCENT: 45 % (ref 20–42)
MCH RBC QN AUTO: 31.5 PG (ref 26–35)
MCHC RBC AUTO-ENTMCNC: 34.6 % (ref 32–34.5)
MCV RBC AUTO: 91 FL (ref 80–99.9)
MONOCYTES ABSOLUTE: 0.34 E9/L (ref 0.1–0.95)
MONOCYTES RELATIVE PERCENT: 6.3 % (ref 2–12)
NEUTROPHILS ABSOLUTE: 2.41 E9/L (ref 1.8–7.3)
NEUTROPHILS RELATIVE PERCENT: 44.6 % (ref 43–80)
PDW BLD-RTO: 13 FL (ref 11.5–15)
PLATELET # BLD: 171 E9/L (ref 130–450)
PMV BLD AUTO: 8.7 FL (ref 7–12)
POTASSIUM REFLEX MAGNESIUM: 3.6 MMOL/L (ref 3.5–5)
RBC # BLD: 4.67 E12/L (ref 3.5–5.5)
SODIUM BLD-SCNC: 141 MMOL/L (ref 132–146)
TOTAL PROTEIN: 6.7 G/DL (ref 6.4–8.3)
TROPONIN, HIGH SENSITIVITY: 9 NG/L (ref 0–9)
WBC # BLD: 5.4 E9/L (ref 4.5–11.5)

## 2022-08-12 PROCEDURE — 80053 COMPREHEN METABOLIC PANEL: CPT

## 2022-08-12 PROCEDURE — 2580000003 HC RX 258: Performed by: STUDENT IN AN ORGANIZED HEALTH CARE EDUCATION/TRAINING PROGRAM

## 2022-08-12 PROCEDURE — 70450 CT HEAD/BRAIN W/O DYE: CPT

## 2022-08-12 PROCEDURE — 93005 ELECTROCARDIOGRAM TRACING: CPT | Performed by: STUDENT IN AN ORGANIZED HEALTH CARE EDUCATION/TRAINING PROGRAM

## 2022-08-12 PROCEDURE — 99285 EMERGENCY DEPT VISIT HI MDM: CPT

## 2022-08-12 PROCEDURE — 71045 X-RAY EXAM CHEST 1 VIEW: CPT

## 2022-08-12 PROCEDURE — 84484 ASSAY OF TROPONIN QUANT: CPT

## 2022-08-12 PROCEDURE — 85025 COMPLETE CBC W/AUTO DIFF WBC: CPT

## 2022-08-12 RX ORDER — 0.9 % SODIUM CHLORIDE 0.9 %
1000 INTRAVENOUS SOLUTION INTRAVENOUS ONCE
Status: COMPLETED | OUTPATIENT
Start: 2022-08-12 | End: 2022-08-12

## 2022-08-12 RX ADMIN — SODIUM CHLORIDE 1000 ML: 9 INJECTION, SOLUTION INTRAVENOUS at 12:24

## 2022-08-12 ASSESSMENT — PAIN - FUNCTIONAL ASSESSMENT: PAIN_FUNCTIONAL_ASSESSMENT: NONE - DENIES PAIN

## 2022-08-12 NOTE — ED PROVIDER NOTES
surgical history that includes Colonoscopy (09/11/2012); Abdominal exploration surgery; Upper gastrointestinal endoscopy (07/29/2014); Breast surgery; and Endometrial ablation. Social History:  reports that she has been smoking cigars and cigarettes. She started smoking about 42 years ago. She has a 39.00 pack-year smoking history. She has never used smokeless tobacco. She reports that she does not drink alcohol and does not use drugs. Family History: family history is not on file. . Unless otherwise noted, family history is non contributory    The patients home medications have been reviewed. Allergies: Aspirin    I have reviewed the past medical history, past surgical history, social history, and family history    ---------------------------------------------------PHYSICAL EXAM--------------------------------------    Constitutional/General: Alert and oriented x3  Head: Normocephalic and atraumatic  Eyes:  EOMI, sclera non icteric  ENT: Oropharynx clear, handling secretions, no trismus, no asymmetry of the posterior oropharynx or uvular edema  Neck: Supple, full ROM, no stridor, no meningeal signs  Respiratory: Lungs clear to auscultation bilaterally, no wheezes, rales, or rhonchi. Not in respiratory distress  Cardiovascular:  Regular rate. Regular rhythm. No murmurs, no gallops, no rubs. 2+ distal pulses. Equal extremity pulses. Gastrointestinal:  Abdomen Soft, Non tender, Non distended. No rebound, guarding, or rigidity. No pulsatile masses. Musculoskeletal: Moves all extremities x 4. Warm and well perfused, no clubbing, no cyanosis, no edema. Capillary refill <3 seconds  Skin: skin warm and dry. No rashes.    Neurologic: GCS 15, no focal deficits, symmetric strength 5/5 in the upper and lower extremities bilaterally  Psychiatric: Normal Affect    -------------------------------------------------- RESULTS -------------------------------------------------  I have personally reviewed all laboratory and imaging results for this patient. Results are listed below.      LABS: (Lab results interpreted by me)  Results for orders placed or performed during the hospital encounter of 08/12/22   CBC with Auto Differential   Result Value Ref Range    WBC 5.4 4.5 - 11.5 E9/L    RBC 4.67 3.50 - 5.50 E12/L    Hemoglobin 14.7 11.5 - 15.5 g/dL    Hematocrit 42.5 34.0 - 48.0 %    MCV 91.0 80.0 - 99.9 fL    MCH 31.5 26.0 - 35.0 pg    MCHC 34.6 (H) 32.0 - 34.5 %    RDW 13.0 11.5 - 15.0 fL    Platelets 809 561 - 781 E9/L    MPV 8.7 7.0 - 12.0 fL    Neutrophils % 44.6 43.0 - 80.0 %    Immature Granulocytes % 0.4 0.0 - 5.0 %    Lymphocytes % 45.0 (H) 20.0 - 42.0 %    Monocytes % 6.3 2.0 - 12.0 %    Eosinophils % 3.0 0.0 - 6.0 %    Basophils % 0.7 0.0 - 2.0 %    Neutrophils Absolute 2.41 1.80 - 7.30 E9/L    Immature Granulocytes # 0.02 E9/L    Lymphocytes Absolute 2.43 1.50 - 4.00 E9/L    Monocytes Absolute 0.34 0.10 - 0.95 E9/L    Eosinophils Absolute 0.16 0.05 - 0.50 E9/L    Basophils Absolute 0.04 0.00 - 0.20 E9/L   Comprehensive Metabolic Panel w/ Reflex to MG   Result Value Ref Range    Sodium 141 132 - 146 mmol/L    Potassium reflex Magnesium 3.6 3.5 - 5.0 mmol/L    Chloride 108 (H) 98 - 107 mmol/L    CO2 23 22 - 29 mmol/L    Anion Gap 10 7 - 16 mmol/L    Glucose 91 74 - 99 mg/dL    BUN 13 6 - 23 mg/dL    Creatinine 0.8 0.5 - 1.0 mg/dL    GFR Non-African American >60 >=60 mL/min/1.73    GFR African American >60     Calcium 9.3 8.6 - 10.2 mg/dL    Total Protein 6.7 6.4 - 8.3 g/dL    Albumin 3.9 3.5 - 5.2 g/dL    Total Bilirubin 0.3 0.0 - 1.2 mg/dL    Alkaline Phosphatase 85 35 - 104 U/L    ALT 9 0 - 32 U/L    AST 11 0 - 31 U/L   Troponin   Result Value Ref Range    Troponin, High Sensitivity 9 0 - 9 ng/L   EKG 12 Lead   Result Value Ref Range    Ventricular Rate 49 BPM    Atrial Rate 49 BPM    P-R Interval 198 ms    QRS Duration 104 ms    Q-T Interval 446 ms    QTc Calculation (Bazett) 402 ms    P Axis 49 degrees    R Axis 25 degrees    T Axis -23 degrees   ,       RADIOLOGY:  Interpreted by Radiologist unless otherwise specified  CT HEAD WO CONTRAST   Final Result   Unremarkable CT of the head. XR CHEST PORTABLE   Final Result   No acute process. EKG Interpretation  Interpreted by emergency department physician, Dr. Jase Patterson     EKG: This EKG is signed and interpreted by me. Rate: 49  Rhythm: Sinus  Interpretation: Sinus bradycardia with sinus arrhythmia, normal axis, incomplete right bundle branch block, nonspecific ST changes in the inferior leads, QTC is 402  Comparison: stable as compared to patient's most recent EKG         ------------------------- NURSING NOTES AND VITALS REVIEWED ---------------------------   The nursing notes within the ED encounter and vital signs as below have been reviewed by myself  /82   Pulse 51   Resp 16   Wt 204 lb (92.5 kg)   SpO2 97%   BMI 33.95 kg/m²     Oxygen Saturation Interpretation: Normal    The patients available past medical records and past encounters were reviewed. ------------------------------ ED COURSE/MEDICAL DECISION MAKING----------------------  Medications   0.9 % sodium chloride bolus (1,000 mLs IntraVENous New Bag 8/12/22 1224)           The cardiac monitor revealed NSR with a heart rate in the 50s as interpreted by me. The cardiac monitor was ordered secondary to the patient's syncope and to monitor the patient for dysrhythmia. CPT [id number]       I, Dr. Jase Patterson, am the primary provider of record    Medical Decision Making:   The patient is a 82-year-old female presents emergency department complaining of syncope. She is hemodynamically stable, nontoxic, and in no acute distress. EKG does not show evidence of acute ischemia, and she has an old right bundle branch block. Troponin is negative. Labs are reassuring. CT head did not show any acute abnormalities. Chest x-ray is normal.  There were no focal neurological deficits. Orthostatics were negative. Patient ambulated in the emergency department difficulties. She was sleeping on reassessment and completely asymptomatic. She states that she felt very hot and almost passed out. Event seems vasovagal in nature. No neurological deficits and remained chest pain-free while in the emergency department. Recommend her to follow-up closely with her family doctor. Strict return precautions were given and she was discharged home in stable condition. Oxygen Saturation Interpretation: 97 % on room air. Re-Evaluations:  ED Course as of 08/12/22 1447   Fri Aug 12, 2022   1423 Orthos were negative. Patient ambulated in the emergency department out any difficulties. She remains completely asymptomatic here. She states that she did not completely lose consciousness and there was a near syncopal episode and she thinks that because she she was very hot working in her suit today at work. You shared decision making and she states that she would like to go home with strict return precautions given. Patient has been verbalized understanding agreement to treatment plan and discharge. [KG]      ED Course User Index  [KG] Isabel Buchanan DO             This patient's ED course included: a personal history and physicial examination, re-evaluation prior to disposition, multiple bedside re-evaluations, IV medications, cardiac monitoring, continuous pulse oximetry, and complex medical decision making and emergency management    This patient has remained hemodynamically stable during their ED course. Counseling: The emergency provider has spoken with the patient and discussed todays results, in addition to providing specific details for the plan of care and counseling regarding the diagnosis and prognosis.   Questions are answered at this time and they are agreeable with the plan.       --------------------------------- IMPRESSION AND DISPOSITION ---------------------------------    IMPRESSION  1. Near syncope        DISPOSITION  Disposition: Discharge to home  Patient condition is stable        NOTE: This report was transcribed using voice recognition software.  Every effort was made to ensure accuracy; however, inadvertent computerized transcription errors may be present       Riya Medellin DO  08/12/22 1442

## 2022-08-12 NOTE — Clinical Note
Amilcar López was seen and treated in our emergency department on 8/12/2022. She may return to work on 08/13/2022. If you have any questions or concerns, please don't hesitate to call.       Riya Medellin, DO

## 2023-01-06 ENCOUNTER — HOSPITAL ENCOUNTER (OUTPATIENT)
Age: 63
Setting detail: OBSERVATION
Discharge: HOME | End: 2023-01-07
Attending: EMERGENCY MEDICINE | Admitting: INTERNAL MEDICINE
Payer: COMMERCIAL

## 2023-01-06 ENCOUNTER — APPOINTMENT (OUTPATIENT)
Dept: CT IMAGING | Age: 63
End: 2023-01-06
Payer: COMMERCIAL

## 2023-01-06 DIAGNOSIS — R29.898 RIGHT LEG WEAKNESS: Primary | ICD-10-CM

## 2023-01-06 LAB
ALBUMIN SERPL-MCNC: 3.8 G/DL (ref 3.5–5.2)
ALP BLD-CCNC: 123 U/L (ref 35–104)
ALT SERPL-CCNC: 10 U/L (ref 0–32)
ANION GAP SERPL CALCULATED.3IONS-SCNC: 11 MMOL/L (ref 7–16)
AST SERPL-CCNC: 11 U/L (ref 0–31)
BASOPHILS ABSOLUTE: 0.03 E9/L (ref 0–0.2)
BASOPHILS RELATIVE PERCENT: 0.5 % (ref 0–2)
BILIRUB SERPL-MCNC: <0.2 MG/DL (ref 0–1.2)
BUN BLDV-MCNC: 16 MG/DL (ref 6–23)
CALCIUM SERPL-MCNC: 9.5 MG/DL (ref 8.6–10.2)
CHLORIDE BLD-SCNC: 106 MMOL/L (ref 98–107)
CO2: 23 MMOL/L (ref 22–29)
CREAT SERPL-MCNC: 0.8 MG/DL (ref 0.5–1)
EKG ATRIAL RATE: 75 BPM
EKG P AXIS: 49 DEGREES
EKG P-R INTERVAL: 202 MS
EKG Q-T INTERVAL: 386 MS
EKG QRS DURATION: 104 MS
EKG QTC CALCULATION (BAZETT): 431 MS
EKG R AXIS: 59 DEGREES
EKG T AXIS: 10 DEGREES
EKG VENTRICULAR RATE: 75 BPM
EOSINOPHILS ABSOLUTE: 0.12 E9/L (ref 0.05–0.5)
EOSINOPHILS RELATIVE PERCENT: 2 % (ref 0–6)
GFR SERPL CREATININE-BSD FRML MDRD: >60 ML/MIN/1.73
GLUCOSE BLD-MCNC: 98 MG/DL (ref 74–99)
HCT VFR BLD CALC: 47.2 % (ref 34–48)
HEMOGLOBIN: 16.3 G/DL (ref 11.5–15.5)
IMMATURE GRANULOCYTES #: 0.01 E9/L
IMMATURE GRANULOCYTES %: 0.2 % (ref 0–5)
LYMPHOCYTES ABSOLUTE: 2.74 E9/L (ref 1.5–4)
LYMPHOCYTES RELATIVE PERCENT: 46.4 % (ref 20–42)
MCH RBC QN AUTO: 32.3 PG (ref 26–35)
MCHC RBC AUTO-ENTMCNC: 34.5 % (ref 32–34.5)
MCV RBC AUTO: 93.7 FL (ref 80–99.9)
MONOCYTES ABSOLUTE: 0.28 E9/L (ref 0.1–0.95)
MONOCYTES RELATIVE PERCENT: 4.7 % (ref 2–12)
NEUTROPHILS ABSOLUTE: 2.73 E9/L (ref 1.8–7.3)
NEUTROPHILS RELATIVE PERCENT: 46.2 % (ref 43–80)
PDW BLD-RTO: 13.1 FL (ref 11.5–15)
PLATELET # BLD: 193 E9/L (ref 130–450)
PMV BLD AUTO: 8.2 FL (ref 7–12)
POTASSIUM REFLEX MAGNESIUM: 3.8 MMOL/L (ref 3.5–5)
RBC # BLD: 5.04 E12/L (ref 3.5–5.5)
SODIUM BLD-SCNC: 140 MMOL/L (ref 132–146)
TOTAL PROTEIN: 6.9 G/DL (ref 6.4–8.3)
TROPONIN, HIGH SENSITIVITY: <6 NG/L (ref 0–9)
WBC # BLD: 5.9 E9/L (ref 4.5–11.5)

## 2023-01-06 PROCEDURE — 93005 ELECTROCARDIOGRAM TRACING: CPT | Performed by: PHYSICIAN ASSISTANT

## 2023-01-06 PROCEDURE — 84484 ASSAY OF TROPONIN QUANT: CPT

## 2023-01-06 PROCEDURE — 99285 EMERGENCY DEPT VISIT HI MDM: CPT

## 2023-01-06 PROCEDURE — 70450 CT HEAD/BRAIN W/O DYE: CPT

## 2023-01-06 PROCEDURE — 96375 TX/PRO/DX INJ NEW DRUG ADDON: CPT

## 2023-01-06 PROCEDURE — 85025 COMPLETE CBC W/AUTO DIFF WBC: CPT

## 2023-01-06 PROCEDURE — 80053 COMPREHEN METABOLIC PANEL: CPT

## 2023-01-06 PROCEDURE — 36415 COLL VENOUS BLD VENIPUNCTURE: CPT

## 2023-01-06 PROCEDURE — 73630 X-RAY EXAM OF FOOT: CPT

## 2023-01-06 PROCEDURE — 6360000002 HC RX W HCPCS: Performed by: PHYSICIAN ASSISTANT

## 2023-01-06 PROCEDURE — 72131 CT LUMBAR SPINE W/O DYE: CPT

## 2023-01-06 PROCEDURE — 96374 THER/PROPH/DIAG INJ IV PUSH: CPT

## 2023-01-06 RX ORDER — METHYLPREDNISOLONE SODIUM SUCCINATE 125 MG/2ML
125 INJECTION, POWDER, LYOPHILIZED, FOR SOLUTION INTRAMUSCULAR; INTRAVENOUS ONCE
Status: COMPLETED | OUTPATIENT
Start: 2023-01-06 | End: 2023-01-06

## 2023-01-06 RX ORDER — KETOROLAC TROMETHAMINE 30 MG/ML
30 INJECTION, SOLUTION INTRAMUSCULAR; INTRAVENOUS ONCE
Status: COMPLETED | OUTPATIENT
Start: 2023-01-06 | End: 2023-01-06

## 2023-01-06 RX ADMIN — KETOROLAC TROMETHAMINE 30 MG: 30 INJECTION, SOLUTION INTRAMUSCULAR; INTRAVENOUS at 18:41

## 2023-01-06 RX ADMIN — METHYLPREDNISOLONE SODIUM SUCCINATE 125 MG: 125 INJECTION, POWDER, FOR SOLUTION INTRAMUSCULAR; INTRAVENOUS at 18:43

## 2023-01-06 ASSESSMENT — PAIN - FUNCTIONAL ASSESSMENT
PAIN_FUNCTIONAL_ASSESSMENT: PREVENTS OR INTERFERES SOME ACTIVE ACTIVITIES AND ADLS
PAIN_FUNCTIONAL_ASSESSMENT: 0-10

## 2023-01-06 ASSESSMENT — PAIN DESCRIPTION - DESCRIPTORS: DESCRIPTORS: DISCOMFORT;SHARP

## 2023-01-06 ASSESSMENT — PAIN SCALES - GENERAL: PAINLEVEL_OUTOF10: 3

## 2023-01-06 ASSESSMENT — PAIN DESCRIPTION - ORIENTATION: ORIENTATION: RIGHT;LOWER

## 2023-01-06 ASSESSMENT — PAIN DESCRIPTION - LOCATION: LOCATION: FOOT;BACK;ANKLE

## 2023-01-06 ASSESSMENT — PAIN DESCRIPTION - PAIN TYPE: TYPE: ACUTE PAIN

## 2023-01-06 NOTE — ED PROVIDER NOTES
Shared RICH-ED Attending Visit. CC: Yes       HPI:  1/6/23, Time: 3:59 PM DEJAH Godfrey is a 63 y.o. female presenting to the ED for foot weakness , beginning 5 Days ago. The complaint has been persistent, moderate in severity, and worsened by walking. Patient comes in with complaint of right foot weakness that started on Monday. Today she has pain at the ankle , she feels the pain is from the way she is dragging her foot. she has fallen several times because she hadnt picked up her foot  She has a pinching feeling in her back but has chronic back pain that does not feel changed the pain does not radiate down her leg. she denies any abdominal pain no urinary or bowel incontinence no saddle paresthesias . Two  weeks ago she would get cramps in her leg if she stretched. She denies headache blurred vision or dizziness no numbness or tingling of extremities . She denies chest pain, palpitations , sob or diaphoresis. Review of Systems:   A complete review of systems was performed and pertinent positives and negatives are stated within HPI, all other systems reviewed and are negative.          --------------------------------------------- PAST HISTORY ---------------------------------------------  Past Medical History:  has no past medical history on file. Past Surgical History:  has a past surgical history that includes Colonoscopy (09/11/2012); Abdominal exploration surgery; Upper gastrointestinal endoscopy (07/29/2014); Breast surgery; and Endometrial ablation. Social History:  reports that she has been smoking cigars and cigarettes. She started smoking about 43 years ago. She has a 39.00 pack-year smoking history. She has never used smokeless tobacco. She reports that she does not drink alcohol and does not use drugs. Family History: family history is not on file. The patients home medications have been reviewed.     Allergies: Aspirin    -------------------------------------------------- RESULTS -------------------------------------------------  All laboratory and radiology results have been personally reviewed by myself   LABS:  Results for orders placed or performed during the hospital encounter of 01/06/23   CBC with Auto Differential   Result Value Ref Range    WBC 5.9 4.5 - 11.5 E9/L    RBC 5.04 3.50 - 5.50 E12/L    Hemoglobin 16.3 (H) 11.5 - 15.5 g/dL    Hematocrit 47.2 34.0 - 48.0 %    MCV 93.7 80.0 - 99.9 fL    MCH 32.3 26.0 - 35.0 pg    MCHC 34.5 32.0 - 34.5 %    RDW 13.1 11.5 - 15.0 fL    Platelets 907 732 - 781 E9/L    MPV 8.2 7.0 - 12.0 fL    Neutrophils % 46.2 43.0 - 80.0 %    Immature Granulocytes % 0.2 0.0 - 5.0 %    Lymphocytes % 46.4 (H) 20.0 - 42.0 %    Monocytes % 4.7 2.0 - 12.0 %    Eosinophils % 2.0 0.0 - 6.0 %    Basophils % 0.5 0.0 - 2.0 %    Neutrophils Absolute 2.73 1.80 - 7.30 E9/L    Immature Granulocytes # 0.01 E9/L    Lymphocytes Absolute 2.74 1.50 - 4.00 E9/L    Monocytes Absolute 0.28 0.10 - 0.95 E9/L    Eosinophils Absolute 0.12 0.05 - 0.50 E9/L    Basophils Absolute 0.03 0.00 - 0.20 E9/L   Comprehensive Metabolic Panel w/ Reflex to MG   Result Value Ref Range    Sodium 140 132 - 146 mmol/L    Potassium reflex Magnesium 3.8 3.5 - 5.0 mmol/L    Chloride 106 98 - 107 mmol/L    CO2 23 22 - 29 mmol/L    Anion Gap 11 7 - 16 mmol/L    Glucose 98 74 - 99 mg/dL    BUN 16 6 - 23 mg/dL    Creatinine 0.8 0.5 - 1.0 mg/dL    Est, Glom Filt Rate >60 >=60 mL/min/1.73    Calcium 9.5 8.6 - 10.2 mg/dL    Total Protein 6.9 6.4 - 8.3 g/dL    Albumin 3.8 3.5 - 5.2 g/dL    Total Bilirubin <0.2 0.0 - 1.2 mg/dL    Alkaline Phosphatase 123 (H) 35 - 104 U/L    ALT 10 0 - 32 U/L    AST 11 0 - 31 U/L   Troponin   Result Value Ref Range    Troponin, High Sensitivity <6 0 - 9 ng/L   EKG 12 Lead   Result Value Ref Range    Ventricular Rate 75 BPM    Atrial Rate 75 BPM    P-R Interval 202 ms    QRS Duration 104 ms    Q-T Interval 386 ms QTc Calculation (Bazett) 431 ms    P Axis 49 degrees    R Axis 59 degrees    T Axis 10 degrees       RADIOLOGY:  Interpreted by Radiologist.  CT HEAD WO CONTRAST   Final Result   No acute intracranial abnormality. CT LUMBAR SPINE WO CONTRAST   Final Result   1. No acute fracture or subluxation. 2. Degenerative disc disease and facet arthropathy in the lumbar spine with   neural foraminal narrowing at L5-S1, left greater than right. RECOMMENDATIONS:   Unavailable         XR FOOT RIGHT (MIN 3 VIEWS)   Final Result   No acute osseous abnormality. Mild forefoot DJD.             ------------------------- NURSING NOTES AND VITALS REVIEWED ---------------------------   The nursing notes within the ED encounter and vital signs as below have been reviewed. BP (!) 145/90   Pulse 70   Temp 98.4 °F (36.9 °C) (Oral)   Resp 16   Wt 204 lb (92.5 kg)   SpO2 96%   BMI 33.95 kg/m²   Oxygen Saturation Interpretation: Normal      ---------------------------------------------------PHYSICAL EXAM--------------------------------------      Constitutional/General: Alert and oriented x3, well appearing, non toxic in NAD  Head: Normocephalic and atraumatic  Eyes: PERRL, EOMI  Mouth: Oropharynx clear, handling secretions, no trismus  Neck: Supple, full ROM,   Pulmonary: Lungs clear to auscultation bilaterally, no wheezes, rales, or rhonchi. Not in respiratory distress  Cardiovascular:  Regular rate and rhythm, no murmurs, gallops, or rubs. 2+ distal pulses  Abdomen: Soft, non tender, non distended,no cva tenderness   Back  no lumbar vertebral tenderness   Extremities: Moves all extremities x 4. Warm and well perfused. Patient with mild weakness on dorsiflexion.  Mild right leg weakness  Normal pulses normal and  sensation   Skin: warm and dry without rash  Neurologic: GCS 15,  Psych: Normal Affect      ------------------------------ ED COURSE/MEDICAL DECISION MAKING----------------------  Medications   methylPREDNISolone sodium (SOLU-MEDROL) injection 125 mg (125 mg IntraVENous Given 1/6/23 1843)   ketorolac (TORADOL) injection 30 mg (30 mg IntraVENous Given 1/6/23 1841)         ED COURSE:    1850  discussed [facility] Dr. Plascencia he would patient transferred for a MRI of the lumbar spine to rule out any cord compression or peroneal nerve injury.  Patient can be either admitted to hospitalist or ER to ER transfer.  Patient will be transferred ER to ER to be able to obtain the MRI sooner.  Hospital bed for are difficult to obtain at this time and will cause delay in obtaining the MRI.    1855 discussed with ER physician  she is agreeable to transfer .      Medical Decision Making:   /ED COURSE:    History From: Patient      Patient is a 62 y.o. female presenting to the ED for pain onset 3 days ago, acute moderate in severity. In the ED, patient was having right leg weakness and weakness of the foot on dorsiflexion with mild pain to the ankle secondary to when she was ambulating. On exam, mild right leg weakness mild dorsiflexion weakness.   I interpreted findings. Rhythm -sinus rhythm.  Labs and CT head CT lumbar x-ray of the right foot obtained to evaluate for cranial bleed mass or CVA, herniated disc cord compression. Patient administered Toradol and Solu-Medrol.    I reviewed and interpreted labs. CBC showing no leukocytosis hemoglobin is 16.3 normal platelets.  CMP with normal electrolytes with slightly elevated alk phos.  Troponin was normal at 6     xray interpreted by me. Interpretation-degenerative changes midfoot no fractures. Radiologist confirms read.    CT head no intracranial bleeding or mass.  CT lumbar showing degenerative changes, disc disease no cord compression    CONSULTS: (Who and What was discussed)  None  Dr. Plascencia updated on patient's physical exam presentation CT findings feels patient does require MRI to rule out any cord compression peroneal nerve injury patient can be admitted to the  hospitalist or eat are to ER. Decision was to transfer to ER for quicker access to obtaining the MRI needed. Dr. Huan Amaral was agreeable to transfer to the ER    Social Determinants of Health : None    Chronic Conditions affecting care:    has no past medical history on file. Chronic back pain   has a past surgical history that includes Colonoscopy (09/11/2012); Abdominal exploration surgery; Upper gastrointestinal endoscopy (07/29/2014); Breast surgery; and Endometrial ablation. Records Reviewed( Source) no prior records available    CC/HPI Summary, DDx, ED Course, and Reassessment:   Patient comes in with complaint of right foot weakness that started on Monday. Today she has pain at the ankle , she feels the pain is from the way she is dragging her foot. she has fallen several times because she hadnt picked up her foot  She has a pinching feeling in her back but has chronic back pain that does not feel changed the pain does not radiate down her leg. she denies any abdominal pain no urinary or bowel incontinence no saddle paresthesias . Two  weeks ago she would get cramps in her leg if she stretched. She denies headache blurred vision or dizziness no numbness or tingling of extremities . She denies chest pain, palpitations , sob or diaphoresis. Differential diagnosis to include CVA, cord compression, cauda equina peroneal nerve compression, sciatica  Patient with no significant improvement here in the ER. Disposition Considerations (Tests not ordered but considered, Shared MRI lumbar   decision Making, Pt Expectation of Test or Tx.): PATIENT Comes in with complaint of right leg foot weakness that started on Monday CT of the head was obtained there was no finding of bleed or mass. CT lumbar with degenerative disc disease mild cord compression that does not explain patient's symptoms she will bleed [facility] Consult was obtained with neurosurgery.   It is agreeable to the transfer    Patient remained hemodynamically stable throughout ED course. ED Course as of 01/06/23 1921   Wang Power Jan 06, 2023   1620 EKG: This EKG is signed and interpreted by me, Dr. Brenna Wilson MD    Rate: 75  Rhythm: Sinus  Interpretation: Normal sinus rhythm, incomplete right bundle branch block normal CA interval, normal QRS, normal axis, normal QT interval, no acute ST or T wave changes, ST abnormalities in lateral leads appear improved when compared to prior EKG  Comparison: changes compared to previous EKG-ST abnormalities in lateral leads appear improved when compared to prior EKG   [JA]   8575 Patient is a 71-year-old female presenting to the ED with weakness in her right foot and leg with inability to dorsiflex beginning a couple days ago. She states that she did have a fall due to that weakness. She did not strike her head or pass out. She states she is chronic back pain which may be slightly worse than baseline. No bowel or bladder incontinence or retention, saddle anesthesia, fevers, or extremity numbness. No chest pain or shortness of breath. She denies headache, facial droop, and dysarthria    PHYSICAL EXAM:  Vitals Reviewed  Constitutional/General: Alert and oriented x3, well appearing, non toxic in NAD  Head: Normocephalic and atraumatic  Eyes: PERRL, EOMI  Mouth: Oropharynx clear, handling secretions, no trismus  Neck: Supple, full ROM,   Pulmonary: Lungs clear to auscultation bilaterally, no wheezes, rales, or rhonchi. Not in respiratory distress  Cardiovascular:  Regular rate and rhythm, no murmurs, gallops, or rubs. 2+ distal pulses  Abdomen: Soft, non tender, non distended,   Extremities: Moves all extremities x 4. Warm and well perfused  Back: No cervical, thoracic, or lumbar tenderness. Normal plantar flexion to bilateral lower extremities. Weak dorsiflexion to right foot. Normal sensation to bilateral upper and lower extremities. No saddle anesthesia.   5/5 strength of bilateral upper extremities. 5/5 strength to left lower extremity. 4/5 strength to right lower extremity  Skin: warm and dry without rash  Neurologic: GCS 15, see NIH below  Psych: Normal Affect     [JA]   1736 Patient's last known well was several days ago. Stroke team was not called because she is not a candidate for TNK or emergent neuro intervention. Will evaluate for stroke with noncontrast head CT though clinically more concern for a spinal issue. She has weakness with dorsi flexion and foot drop when walking. I discussed with the patient that she will need admission for MRI of her spine evaluate for lesion or cord compression but will obtain preliminary imaging in the freestanding ED. Plan to consult neurosurgery from the ED. [JA]      ED Course User Index  [BENITO] Carlo Shay MD       Medications   methylPREDNISolone sodium (SOLU-MEDROL) injection 125 mg (125 mg IntraVENous Given 1/6/23 1843)   ketorolac (TORADOL) injection 30 mg (30 mg IntraVENous Given 1/6/23 1841)       New Prescriptions    No medications on file       Critical Care:  Please note that the withdrawal or failure to initiate urgent interventions for this patient would likely result in a life threatening deterioration or permanent disability. Accordingly this patient received 30 minutes of critical care time, excluding separately billable procedures. --------------------------------- IMPRESSION AND DISPOSITION ---------------------------------    IMPRESSION  1. Right leg weakness        DISPOSITION  Disposition: Transfer to Critical access hospital to Er  Patient condition is fair     Counseling: The emergency provider has spoken with the patient and discussed todays results, in addition to providing specific details for the plan of care and counseling regarding the diagnosis and prognosis. Questions are answered at this time and they are agreeable with the plan.         NOTE: This report was transcribed using voice recognition software.  Every effort was made to ensure accuracy; however, inadvertent computerized transcription errors may be present      Minus Libertad Fan  01/06/23 1924

## 2023-01-06 NOTE — Clinical Note
Discharge Plan[de-identified] Other/[facility])   Telemetry/Cardiac Monitoring Required?: Yes

## 2023-01-07 ENCOUNTER — APPOINTMENT (OUTPATIENT)
Dept: MRI IMAGING | Age: 63
End: 2023-01-07
Payer: COMMERCIAL

## 2023-01-07 VITALS
SYSTOLIC BLOOD PRESSURE: 130 MMHG | DIASTOLIC BLOOD PRESSURE: 78 MMHG | HEART RATE: 75 BPM | OXYGEN SATURATION: 93 % | WEIGHT: 204 LBS | RESPIRATION RATE: 15 BRPM | BODY MASS INDEX: 33.95 KG/M2 | TEMPERATURE: 98.5 F

## 2023-01-07 PROBLEM — R29.898 RIGHT LEG WEAKNESS: Status: ACTIVE | Noted: 2023-01-07

## 2023-01-07 PROBLEM — M21.371 RIGHT FOOT DROP: Status: ACTIVE | Noted: 2023-01-07

## 2023-01-07 PROCEDURE — 6370000000 HC RX 637 (ALT 250 FOR IP): Performed by: INTERNAL MEDICINE

## 2023-01-07 PROCEDURE — 70551 MRI BRAIN STEM W/O DYE: CPT

## 2023-01-07 PROCEDURE — G0378 HOSPITAL OBSERVATION PER HR: HCPCS

## 2023-01-07 PROCEDURE — 72148 MRI LUMBAR SPINE W/O DYE: CPT

## 2023-01-07 RX ORDER — DIAZEPAM 5 MG/1
5 TABLET ORAL ONCE
Status: COMPLETED | OUTPATIENT
Start: 2023-01-07 | End: 2023-01-07

## 2023-01-07 RX ADMIN — DIAZEPAM 5 MG: 5 TABLET ORAL at 07:24

## 2023-01-07 NOTE — ED NOTES
Spoke with Dr. Isidra Borrego who cleared patient from his stand point. Neurosurgery stated they will set up PT outpatient and follow up outpatient. Waiting for Discharge orders/papers to be placed.      Audrey Hurst RN  01/07/23 0185

## 2023-01-07 NOTE — CONSULTS
[address]                  [address] [place],  [facility]                                  CONSULTATION    PATIENT NAME: Ale Edmonds                  :        1960  MED REC NO:   [id number]                            ROOM:       [facility]  ACCOUNT NO:   [de-identified]                           ADMIT DATE: 2023  PROVIDER:     Shane Garcia MD    CONSULT DATE:  2023    REASON FOR CONSULT:  Painless right footdrop. HISTORY OF PRESENT ILLNESS:  The patient is a 68-year-old lady who  presented to the emergency room with right-sided footdrop since Tuesday. She states that a week or two prior to the footdrop that she had  developed some spasm down her right leg, but she denies any pain or  numbness or tingling in the right leg. Currently, she was seen in  [facility] where she had a CT scan that showed some disk bulges and some  foraminal stenosis with the left side being worse than the right. She  was ultimately transported to [facility] for  further evaluation and management. She denies any loss of control of  bowel or bladder function. PAST MEDICAL HISTORY:  Positive for heel spurs. SOCIAL HISTORY:  Positive for tobacco use. FAMILY HISTORY:  Positive for heart disease and hypertension in her  father. PAST SURGICAL HISTORY:  Positive for tubal ligation and breast reduction  surgery. HOME MEDICATIONS:  None. ALLERGIES:  Include ASPIRIN. REVIEW OF SYSTEMS:  HEENT:  Negative for headache, double vision, blurry  vision. CARDIOVASCULAR:  Negative for chest pain or mild palpitations. RESPIRATORY:  Negative for shortness of breath, asthma, bronchitis or  pneumonia. GASTROINTESTINAL:  Negative for heartburn, nausea, vomiting,  diarrhea, constipation. GENITOURINARY:  Negative for hematuria or  dysuria. HEMATOLOGIC:  Negative for easy bleeding or bruising.    INFECTIOUS:  Negative for any recent infection. MUSCULOSKELETAL:   Positive for some back pain. PSYCHIATRIC:  Negative for anxiety or  depression. NEUROLOGIC:  Negative for seizure or stroke. ENDOCRINE:   Negative for thyroid disorder or diabetes. PHYSICAL EXAMINATION:  VITAL SIGNS:  She is currently afebrile with a T-current of 36.9 degrees  Celsius, pulse 91, blood pressure is 139/87. GENERAL:  She is resting in bed. Does not appear to be in any acute  distress. Appears her stated age. HEENT:  Her head is normocephalic and atraumatic. Pupils are 3-2 mm and  reactive. She has no drainage out of her eyes, ears, nose, and throat. SKIN:  Her skin is warm and dry. MUSCULOSKELETAL:  She has got good range of motion of bilateral upper  and lower extremities. ABDOMEN:  Soft, nontender, nondistended. RESPIRATORY:  She is not using any accessory muscles of respiration. NEUROLOGIC:  Rest of her neurologic exam, she is awake, alert, and  oriented x3. Cranial nerves II-XII are intact bilaterally. Motor exam  reveals 5/5 strength in bilateral upper and in her left lower extremity. In her right lower extremity, she has 1/5 strength in dorsiflexor and  4/5 strength in plantar flexor. Sensation is grossly intact to light  touch. Reflexes are 2+ and symmetric. Toes are going down. LABORATORY DATA:  She has white blood cell count of 5.9, hemoglobin  16.3, hematocrit 47.2, platelet count 980. On review of imaging, CT  scan of her lumbar spine shows foraminal stenosis from L3-S1 with the  left side being worse. ASSESSMENT/PLAN:  The patient is a 29-year-old lady with painless  right-sided footdrop. She is neurologically stable. Plan is to obtain  an MRI of her lumbar spine and also an MRI of her brain to rule out  essential cause of her footdrop.         Subhash Mccarty MD    D: 01/07/2023 8:26:34       T: 01/07/2023 8:28:54     [email or web address]  Job#: [id number]     Doc#: [id number]    CC:

## 2023-01-07 NOTE — HOME CARE
[facility]   History and Physical      CHIEF COMPLAINT: Right leg weakness      HISTORY OF PRESENT ILLNESS:    Patient was seen in the emergency room at the main campus of [facility] earlier this morning  Registered nurse at bedside  Spoke with the ER physician at the time of admission  Data reviewed in detail with her  45-year-old -American woman essentially in good health presents with 4-day history of right leg weakness associated with right foot drop  No history of trauma  No headaches  No significant back pain currently  No tingling numbness reported  She describes this as right leg giving out when she is trying to ambulate  Admitted for further management    Past Medical History  Degenerative disc disease of the lumbar spine  Essential hypertension currently on no medications    Past Surgical History:    Past Surgical History:   Procedure Laterality Date    ABDOMINAL EXPLORATION SURGERY      BREAST SURGERY      COLONOSCOPY  09/11/2012    -normal    ENDOMETRIAL ABLATION      UPPER GASTROINTESTINAL ENDOSCOPY  07/29/2014    multiple gstric and duodenal polyps       Medications Prior to Admission:    None    Allergies:    Aspirin    Social History:    reports that she has been smoking cigars and cigarettes. She started smoking about 43 years ago. She has a 39.00 pack-year smoking history. She has never used smokeless tobacco. She reports that she does not drink alcohol and does not use drugs. Family History:   family history is not on file. REVIEW OF SYSTEMS:  As above in the HPI, otherwise negative    PHYSICAL EXAM:    Vitals:  /78   Pulse 75   Temp 98.5 °F (36.9 °C) (Oral)   Resp 15   Wt 204 lb (92.5 kg)   SpO2 93%   BMI 33.95 kg/m²     General:  Awake, alert, oriented X 3. Well developed, well nourished, well groomed. No apparent distress. HEENT:  Normocephalic, atraumatic. Pupils equal, round, reactive to light. No scleral icterus.   No conjunctival injection. Normal lips, teeth, and gums. No nasal discharge. Neck:  Supple  Heart:  RRR, no murmurs, gallops, rubs  Lungs:  CTA bilaterally, bilat symmetrical expansion, no wheeze, rales, or rhonchi  Abdomen:   Bowel sounds present, soft, nontender, no masses, no organomegaly, no peritoneal signs  Extremities:  No clubbing, cyanosis, or edema  Skin:  Warm and dry, no open lesions or rash  Neuro: Bilateral upper extremities show symmetrical and normal strength by motor exam  Sensory exam normal  Left lower extremity with full strength and full sensation intact  Right lower extremity foot drop noted  Tendon reflexes intact  Breast: deferred  Rectal: deferred  Genitalia:  deferred    LABS:  Data reviewed      ASSESSMENT:      Principal Problem:    Right leg weakness and right foot drop  MR studies of brain and lumbar spine unrevealing  I suspect this is a peripheral nerve injury      PLAN:  Outpatient follow-up with neurosurgery PCP recommended  Outpatient physical therapy recommended  Consider EMG studies as outpatient  Patient can be discharged    Shereen Ely MD  2:06 PM  1/7/2023

## 2023-01-07 NOTE — ED PROVIDER NOTES
Department of Emergency Medicine   ED  Provider Note  Admit Date/RoomTime: 1/6/2023  3:54 PM  ED Room: [facility]          History of Present Illness:  1/7/23, Time: 1:09 AM EST  Chief Complaint   Patient presents with    Foot Pain     Right foot pain, having trouble bending toes and picking up foot. Having \"a lot of pinching\" in lower back. Other     Transfer from [facility] for cord compression rule-out                Leonidas Kumar is a 63 y.o. female presenting to the ED for leg weakness. Came on gradually, the makes it better or worse, does have an associated back pain. Patient states 3 days ago she noticed that she was having trouble lifting her right leg. Is gradually gotten worse. Says she has chronic back pain, it is slightly worse. She was post to get injections. She went to [facility] today, was evaluated, transferred here for further evaluation. Denies any change in bowel or bladder. Denies any weakness or numbness in the extremities otherwise. Denies any saddle paresthesias. Denies any loss of bowel or or bladder control. Denies any other symptoms or complaints. Review of Systems:   Pertinent positives and negatives are stated within HPI, all other systems reviewed and are negative.        --------------------------------------------- PAST HISTORY ---------------------------------------------  Past Medical History:  has no past medical history on file. Past Surgical History:  has a past surgical history that includes Colonoscopy (09/11/2012); Abdominal exploration surgery; Upper gastrointestinal endoscopy (07/29/2014); Breast surgery; and Endometrial ablation. Social History:  reports that she has been smoking cigars and cigarettes. She started smoking about 43 years ago. She has a 39.00 pack-year smoking history. She has never used smokeless tobacco. She reports that she does not drink alcohol and does not use drugs. Family History: family history is not on file. . Unless otherwise noted, family history is non contributory    The patients home medications have been reviewed. Allergies: Aspirin        ---------------------------------------------------PHYSICAL EXAM--------------------------------------    Constitutional/General: Alert and oriented x3  Head: Normocephalic and atraumatic  Eyes: PERRL, EOMI, sclera non icteric  Mouth: Oropharynx clear, handling secretions, no trismus, no asymmetry of the posterior oropharynx or uvular edema  Neck: Supple, full ROM, no stridor, no meningeal signs  Respiratory: Lungs clear to auscultation bilaterally, no wheezes, rales, or rhonchi. Not in respiratory distress  Cardiovascular:  Regular rate. Regular rhythm. 2+ distal pulses. Equal extremity pulses. Chest: No chest wall tenderness  GI:  Abdomen Soft, Non tender, Non distended. No rebound, guarding, or rigidity. No pulsatile masses. Musculoskeletal: Moves all extremities x 4. Warm and well perfused, no clubbing, cyanosis, or edema. Capillary refill <3 seconds  Integument: skin warm and dry. No rashes. Back: No bony tenderness or step-offs, no gross signs of injury or trauma  Neurologic: GCS 15, NIH is 1 for right lower extremity weakness, otherwise, no focal neurological deficits  Psychiatric: Normal Affect          -------------------------------------------------- RESULTS -------------------------------------------------  I have personally reviewed all laboratory and imaging results for this patient. Results are listed below.      LABS: (Lab results interpreted by me)  Results for orders placed or performed during the hospital encounter of 01/06/23   CBC with Auto Differential   Result Value Ref Range    WBC 5.9 4.5 - 11.5 E9/L    RBC 5.04 3.50 - 5.50 E12/L    Hemoglobin 16.3 (H) 11.5 - 15.5 g/dL    Hematocrit 47.2 34.0 - 48.0 %    MCV 93.7 80.0 - 99.9 fL    MCH 32.3 26.0 - 35.0 pg    MCHC 34.5 32.0 - 34.5 %    RDW 13.1 11.5 - 15.0 fL    Platelets 328 970 - 265 E9/L    MPV 8.2 7.0 - 12.0 fL    Neutrophils % 46.2 43.0 - 80.0 %    Immature Granulocytes % 0.2 0.0 - 5.0 %    Lymphocytes % 46.4 (H) 20.0 - 42.0 %    Monocytes % 4.7 2.0 - 12.0 %    Eosinophils % 2.0 0.0 - 6.0 %    Basophils % 0.5 0.0 - 2.0 %    Neutrophils Absolute 2.73 1.80 - 7.30 E9/L    Immature Granulocytes # 0.01 E9/L    Lymphocytes Absolute 2.74 1.50 - 4.00 E9/L    Monocytes Absolute 0.28 0.10 - 0.95 E9/L    Eosinophils Absolute 0.12 0.05 - 0.50 E9/L    Basophils Absolute 0.03 0.00 - 0.20 E9/L   Comprehensive Metabolic Panel w/ Reflex to MG   Result Value Ref Range    Sodium 140 132 - 146 mmol/L    Potassium reflex Magnesium 3.8 3.5 - 5.0 mmol/L    Chloride 106 98 - 107 mmol/L    CO2 23 22 - 29 mmol/L    Anion Gap 11 7 - 16 mmol/L    Glucose 98 74 - 99 mg/dL    BUN 16 6 - 23 mg/dL    Creatinine 0.8 0.5 - 1.0 mg/dL    Est, Glom Filt Rate >60 >=60 mL/min/1.73    Calcium 9.5 8.6 - 10.2 mg/dL    Total Protein 6.9 6.4 - 8.3 g/dL    Albumin 3.8 3.5 - 5.2 g/dL    Total Bilirubin <0.2 0.0 - 1.2 mg/dL    Alkaline Phosphatase 123 (H) 35 - 104 U/L    ALT 10 0 - 32 U/L    AST 11 0 - 31 U/L   Troponin   Result Value Ref Range    Troponin, High Sensitivity <6 0 - 9 ng/L   EKG 12 Lead   Result Value Ref Range    Ventricular Rate 75 BPM    Atrial Rate 75 BPM    P-R Interval 202 ms    QRS Duration 104 ms    Q-T Interval 386 ms    QTc Calculation (Bazett) 431 ms    P Axis 49 degrees    R Axis 59 degrees    T Axis 10 degrees   ,       RADIOLOGY:  Interpreted by Radiologist unless otherwise specified  CT HEAD WO CONTRAST   Final Result   No acute intracranial abnormality. CT LUMBAR SPINE WO CONTRAST   Final Result   1. No acute fracture or subluxation. 2. Degenerative disc disease and facet arthropathy in the lumbar spine with   neural foraminal narrowing at L5-S1, left greater than right. RECOMMENDATIONS:   Unavailable         XR FOOT RIGHT (MIN 3 VIEWS)   Final Result   No acute osseous abnormality. Mild forefoot DJD. MRI BRAIN WO CONTRAST    (Results Pending)   MRI LUMBAR SPINE WO CONTRAST    (Results Pending)         EKG Interpretation  Interpreted by emergency department physician, Dr. Garo Avelar       ------------------------- NURSING NOTES AND VITALS REVIEWED ---------------------------   The nursing notes within the ED encounter and vital signs as below have been reviewed by myself  BP (!) 155/98   Pulse 73   Temp 98.5 °F (36.9 °C) (Oral)   Resp 16   Wt 204 lb (92.5 kg)   SpO2 95%   BMI 33.95 kg/m²     Oxygen Saturation Interpretation: Normal    The patients available past medical records and past encounters were reviewed. ------------------------------ ED COURSE/MEDICAL DECISION MAKING----------------------  Medications   methylPREDNISolone sodium (SOLU-MEDROL) injection 125 mg (125 mg IntraVENous Given 1/6/23 1843)   ketorolac (TORADOL) injection 30 mg (30 mg IntraVENous Given 1/6/23 1841)                   Medical Decision Making:      Patient presents with right leg weakness for the past 3 days. Concern for stroke, cord compression, among other pathologies. She is outside the TNK and IR window. Vitals are stable, she required no analgesic medication on arrival.    Labs interpreted by me at the outside facility, no significant findings on the CBC, CMP, and troponin. Head CT was read by radiology as negative. Discussed with Dr. Carol Wan, agrees with MRI, not necessarily emergent. MRI of the brain we also added his stroke is a possibility for this patient. Reevaluation, patient's resting comfortably. No symptoms or complaints. Discussed with the hospitalist, patient be admitted. Counseling: The emergency provider has spoken with the patient and discussed todays results, in addition to providing specific details for the plan of care and counseling regarding the diagnosis and prognosis. Questions are answered at this time and they are agreeable with the plan. --------------------------------- IMPRESSION AND DISPOSITION ---------------------------------    IMPRESSION  1. Right leg weakness        DISPOSITION  Disposition: Admit to telemetry  Patient condition is stable        NOTE: This report was transcribed using voice recognition software.  Every effort was made to ensure accuracy; however, inadvertent computerized transcription errors may be present        Jan Manrique MD  01/07/23 2799

## 2023-01-07 NOTE — ED NOTES
[facility] ED called to give N2N. On hold for 8 minutes, no answer. Will try again.      So Florian RN  01/06/23 2002

## 2023-01-07 NOTE — ED NOTES
Patient given dr. Richard Cabezas office information for follow up.      Debra Morales RN  01/07/23 0366

## 2023-01-09 LAB
EKG ATRIAL RATE: 75 BPM
EKG P AXIS: 49 DEGREES
EKG P-R INTERVAL: 202 MS
EKG Q-T INTERVAL: 386 MS
EKG QRS DURATION: 104 MS
EKG QTC CALCULATION (BAZETT): 431 MS
EKG R AXIS: 59 DEGREES
EKG T AXIS: 10 DEGREES
EKG VENTRICULAR RATE: 75 BPM

## 2023-01-09 PROCEDURE — 93010 ELECTROCARDIOGRAM REPORT: CPT | Performed by: INTERNAL MEDICINE

## 2023-01-11 DIAGNOSIS — M21.371 RIGHT FOOT DROP: Primary | ICD-10-CM

## 2023-01-26 ENCOUNTER — OFFICE VISIT (OUTPATIENT)
Dept: NEUROSURGERY | Age: 63
End: 2023-01-26
Payer: COMMERCIAL

## 2023-01-26 VITALS
BODY MASS INDEX: 30.92 KG/M2 | DIASTOLIC BLOOD PRESSURE: 91 MMHG | RESPIRATION RATE: 16 BRPM | SYSTOLIC BLOOD PRESSURE: 129 MMHG | HEIGHT: 68 IN | WEIGHT: 204 LBS | OXYGEN SATURATION: 94 % | TEMPERATURE: 98.2 F | HEART RATE: 80 BPM

## 2023-01-26 DIAGNOSIS — G57.31 RIGHT PERONEAL NERVE PALSY: Primary | ICD-10-CM

## 2023-01-26 PROCEDURE — G8484 FLU IMMUNIZE NO ADMIN: HCPCS | Performed by: NEUROLOGICAL SURGERY

## 2023-01-26 PROCEDURE — G8427 DOCREV CUR MEDS BY ELIG CLIN: HCPCS | Performed by: NEUROLOGICAL SURGERY

## 2023-01-26 PROCEDURE — G8417 CALC BMI ABV UP PARAM F/U: HCPCS | Performed by: NEUROLOGICAL SURGERY

## 2023-01-26 PROCEDURE — 3017F COLORECTAL CA SCREEN DOC REV: CPT | Performed by: NEUROLOGICAL SURGERY

## 2023-01-26 PROCEDURE — 99212 OFFICE O/P EST SF 10 MIN: CPT

## 2023-01-26 PROCEDURE — 3080F DIAST BP >= 90 MM HG: CPT | Performed by: NEUROLOGICAL SURGERY

## 2023-01-26 PROCEDURE — 3074F SYST BP LT 130 MM HG: CPT | Performed by: NEUROLOGICAL SURGERY

## 2023-01-26 PROCEDURE — 99212 OFFICE O/P EST SF 10 MIN: CPT | Performed by: NEUROLOGICAL SURGERY

## 2023-01-26 PROCEDURE — 4004F PT TOBACCO SCREEN RCVD TLK: CPT | Performed by: NEUROLOGICAL SURGERY

## 2023-01-26 NOTE — PROGRESS NOTES
Patient is here for follow up from a hospital consult for: right leg weakness    Physical exam  Alert and Oriented X3  PERRLA, EOMI  AILCEA 5/5 except 1/5 in RLE  Sensation intact to LT and PP  Reflexes are 2+ and symmetric    A/P: patient is here for follow up for: right leg weakness. Her EMG shows right peroneal nerve palsy.   I will refer to Dr. Evangelina Curiel at [address] [place] [address], MD

## 2023-04-18 ENCOUNTER — TREATMENT (OUTPATIENT)
Dept: PHYSICAL THERAPY | Age: 63
End: 2023-04-18

## 2023-04-18 DIAGNOSIS — G57.31 LESION OF LATERAL POPLITEAL NERVE, RIGHT LOWER LIMB: Primary | ICD-10-CM

## 2023-04-18 NOTE — PROGRESS NOTES
[facility]          Phone: [phone] Fax: [phone]    Physical Therapy Daily Treatment Note  Date:  2023    Patient Name:  Mir Gama    :  1960  MRN: [id number]    Evaluating therapist: Justin Carlisle PT, DPT  [id number]    Restrictions/Precautions:      Diagnosis:     Diagnosis Orders   1. Lesion of lateral popliteal nerve, right lower limb            Treatment Diagnosis:    Insurance/Certification information:  Medical [place]  Referring Physician: MAYRA Logan  Plan of care signed (Y/N):    Visit# / total visits:  3/10  Pain level: 3-4/10   Time In:  1420  Time Out:  1448    Subjective:  Pt reports aching of lower leg and ankle of R LE. Exercises:  Exercise/Equipment Resistance/Repetitions Other comments            Long sitting gastroc stretch 20 sec x3 Manually assisted by therapist     Seated toe flexion/extension             AAROM DF with prestretch 10x      4-way ankle with manual resistance 5 x2, 3 sec hold Isometrics     Standing weight shift 10x lateral, 10x A/P to R foot In // bars     Single leg stance 20 sec x2 R LE In // bars   Ankle circles 10x CW/CCW        Ankle alphabet 1x Manual assist to prevent compensation of upper leg                                                                     Other Therapeutic Activities:  Pt requested to hold NMES d/t soreness this date. She demonstrates Trace+ contraction in DF and Eversion movements of R ankle with R LE elevated on stool. Therapist provided stabilization to lower leg to prevent compensatory movements throughout all TE's. Home Exercise Program:  Long sitting gastroc stretch, toe flexion/extension    Manual Treatments:  Kinesiology tape applied with R foot in DF along tibialis anterior with 75% stretch with I-strip, additional I-strip applied along fibularis longus with 50% stretch.     Modalities:     Time-in Time-out Total Time   49688  Evaluation Low Complexity      [id number]

## 2023-04-20 ENCOUNTER — TREATMENT (OUTPATIENT)
Dept: PHYSICAL THERAPY | Age: 63
End: 2023-04-20

## 2023-04-20 DIAGNOSIS — G57.31 LESION OF LATERAL POPLITEAL NERVE, RIGHT LOWER LIMB: Primary | ICD-10-CM

## 2023-04-20 NOTE — PROGRESS NOTES
[facility]          Phone: [phone] Fax: [phone]    Physical Therapy Daily Treatment Note  Date:  2023    Patient Name:  Maddie Ellis    :  1960  MRN: [id number]    Evaluating therapist: Franci Tirado PT, DPT  [id number]    Restrictions/Precautions:      Diagnosis:     Diagnosis Orders   1. Lesion of lateral popliteal nerve, right lower limb            Treatment Diagnosis:    Insurance/Certification information:  Medical [place]  Referring Physician: MAYRA Salguero  Plan of care signed (Y/N):    Visit# / total visits:  4/10  Pain level: 0/10   Time In:  1500  Time Out:  1535    Subjective:  Pt reports no c/o pain. She notes improved DF and foot clearance since last session. Exercises:  Exercise/Equipment Resistance/Repetitions Other comments            Long sitting gastroc stretch 20 sec x3 Manually assisted by therapist     Seated toe flexion/extension             AAROM DF with prestretch      4-way ankle with manual resistance 5x, 3 sec hold Isometrics     Standing weight shift 10x lateral, 10x A/P to R foot In // bars     Single leg stance 20 sec x2 R LE In // bars   Ankle circles 10x CW/CCW        Ankle alphabet 1x Manual assist to prevent compensation of upper leg                                                                     Other Therapeutic Activities:  Pt returned to NMES this date with improved initiation of contraction during on phase. Pt noted to have increased DF musculature fatigue following NMES when attempting AROM and isometric activities. Home Exercise Program:  Long sitting gastroc stretch, toe flexion/extension    Manual Treatments:  Kinesiology tape applied with R foot in DF along tibialis anterior with 75% stretch with I-strip, additional I-strip applied along fibularis longus with 50% stretch.     Modalities:  NMES x10 min 10/10 with 1 sec ramp to fibularis longus; common peroneal innervation with AAROM to maximize

## 2023-04-26 ENCOUNTER — TREATMENT (OUTPATIENT)
Dept: PHYSICAL THERAPY | Age: 63
End: 2023-04-26
Payer: COMMERCIAL

## 2023-04-26 DIAGNOSIS — G57.31 LESION OF LATERAL POPLITEAL NERVE, RIGHT LOWER LIMB: Primary | ICD-10-CM

## 2023-04-26 PROCEDURE — 97110 THERAPEUTIC EXERCISES: CPT | Performed by: PHYSICAL THERAPIST

## 2023-04-26 NOTE — PROGRESS NOTES
[facility]          Phone: [phone] Fax: [phone]    Physical Therapy Daily Treatment Note  Date:  2023    Patient Name:  Casandra Casey    :  1960  MRN: [id number]    Evaluating therapist: Tana Avelar PT, DPT  [id number]    Restrictions/Precautions:      Diagnosis:     Diagnosis Orders   1. Lesion of lateral popliteal nerve, right lower limb              Treatment Diagnosis:    Insurance/Certification information:  Medical [place]  Referring Physician: MAYRA Herzog  Plan of care signed (Y/N):    Visit# / total visits:  5/10  Pain level: 0/10   Time In:  1534  Time Out:  1612    Subjective:  Pt reports no c/o pain. She notes improved DF and foot clearance since last session. Exercises:  Exercise/Equipment Resistance/Repetitions Other comments            Long sitting gastroc stretch Manually assisted by therapist     Seated toe flexion/extension 10x             AAROM DF with prestretch      4-way ankle with manual resistance 10x      Standing weight shift 10x lateral, 10x A/P to R foot In // bars     Single leg stance 20 sec x3 R LE In // bars   Ankle circles x CW/CCW        Ankle alphabet 1x Manual assist to prevent compensation of upper leg      BAPS PF/DF 10x Level 1     BAPS Invs/Jesus 10x Level 1     BAPS CW/CCW 10x ea direction Level 1     AAROM Ankle DF 10x2             Heel raises 10x                           Other Therapeutic Activities:  Pt demonstrated improved initiation of movement from previous session when initiating BAPS board. Muscle fatigues quickly and pt had difficulty with 4-way ankle into eversion and dorsiflexion d/t muscular fatigue. Home Exercise Program:  Long sitting gastroc stretch, toe flexion/extension    Manual Treatments:  Kinesiology tape applied with R foot in DF along tibialis anterior with 75% stretch with I-strip, additional I-strip applied along fibularis longus with 50% stretch.     Modalities:  NMES x10

## 2023-05-04 ENCOUNTER — TREATMENT (OUTPATIENT)
Dept: PHYSICAL THERAPY | Age: 63
End: 2023-05-04

## 2023-05-04 DIAGNOSIS — G57.31 LESION OF LATERAL POPLITEAL NERVE, RIGHT LOWER LIMB: Primary | ICD-10-CM

## 2023-05-04 NOTE — PROGRESS NOTES
[facility]          Phone: [phone] Fax: [phone]    Physical Therapy Daily Treatment Note  Date:  2023    Patient Name:  Sanjeev Armenta    :  1960  MRN: [id number]    Evaluating therapist: Ammy Hernandez PT, DPT  [id number]    Restrictions/Precautions:      Diagnosis:     Diagnosis Orders   1. Lesion of lateral popliteal nerve, right lower limb            Treatment Diagnosis:    Insurance/Certification information:  Medical [place]  Referring Physician: MAYRA Tay  Plan of care signed (Y/N):    Visit# / total visits:  6/10  Pain level: 0/10   Time In:  1340  Time Out:  1418    Subjective:  Pt reports no c/o pain this date. Exercises:  Exercise/Equipment Resistance/Repetitions Other comments            Long sitting gastroc stretch Manually assisted by therapist     Seated toe flexion/extension 10x             AAROM DF with prestretch      4-way ankle with manual resistance 10x      Standing weight shift In // bars     Single leg stance 20 sec x3 R LE In // bars   Ankle circles x CW/CCW        Ankle alphabet 1x Manual assist to prevent compensation of upper leg      BAPS PF/DF 10x2 Level 1     BAPS Invs/Jesus 10x Level 1     BAPS CW/CCW 10x ea direction Level 1     AAROM Ankle DF 10x2             Heel raises 10x                           Other Therapeutic Activities:  Pt continues to tolerate strengthening TE's without issues. Improving eversion slowly but has minimal dorsiflexion return at this stage of rehab. Home Exercise Program:  Long sitting gastroc stretch, toe flexion/extension    Manual Treatments:  Kinesiology tape applied with R foot in DF along tibialis anterior with 75% stretch with I-strip, additional I-strip applied along fibularis longus with 50% stretch.     Modalities:  NMES x10 min 10/10 with 1 sec ramp to fibularis longus; common peroneal innervation with AAROM to maximize engagement with DF     Time-in Time-out Total Time

## 2023-05-22 ENCOUNTER — TREATMENT (OUTPATIENT)
Dept: PHYSICAL THERAPY | Age: 63
End: 2023-05-22
Payer: COMMERCIAL

## 2023-05-22 DIAGNOSIS — G57.31 LESION OF LATERAL POPLITEAL NERVE, RIGHT LOWER LIMB: Primary | ICD-10-CM

## 2023-05-22 PROCEDURE — 97110 THERAPEUTIC EXERCISES: CPT

## 2023-05-22 PROCEDURE — 97112 NEUROMUSCULAR REEDUCATION: CPT

## 2023-05-22 NOTE — PROGRESS NOTES
[facility]          Phone: [phone] Fax: [phone]    Physical Therapy Daily Treatment Note  Date:  2023    Patient Name:  Sanjeev Armenta    :  1960  MRN: [id number]    Evaluating therapist: Ammy Hernandez PT, DPT  [id number]    Restrictions/Precautions:      Diagnosis:     Diagnosis Orders   1. Lesion of lateral popliteal nerve, right lower limb            Treatment Diagnosis:    Insurance/Certification information:  Medical [place]  Referring Physician: MAYRA Tay  Plan of care signed (Y/N):    Visit# / total visits:  7/10  Pain level: 0/10   Time In:  1300  Time Out:  1340    Subjective:  Pt reports no c/o pain this date. Exercises:  Exercise/Equipment Resistance/Repetitions Other comments            Long sitting gastroc stretch Manually assisted by therapist     Seated toe flexion/extension 10x             AAROM DF with prestretch 10x      4-way ankle with manual resistance 10x  tolerated resist w/ INV, PF  AAROM w/ EV, DF      Standing weight shift In // bars     Single leg stance 20 sec x3 R LE In // bars   Ankle circles CW/CCW        Ankle alphabet 1x Manual assist to prevent compensation of upper leg      BAPS PF/DF 10x2  AAROM DF Level 1     BAPS Invs/Jesus 10x AROM Level 1     BAPS CW/CCW 10x ea direction AAROM with CW Level 1     AAROM Ankle DF 10x2             Heel raises 10x                           Other Therapeutic Activities:  Improving eversion slowly but has minimal dorsiflexion return at this stage of rehab.     Home Exercise Program:  Long sitting gastroc stretch, toe flexion/extension    Manual Treatments:      Modalities:  NMES x10 min 10/10 with 1 sec ramp  14 mA to fibularis longus; common peroneal innervation with AAROM to maximize engagement with DF     Time-in Time-out Total Time   07590  Evaluation Low Complexity      92518  Evaluation Med Complexity      55525  Evaluation High Complexity      56940  Ther Ex 1300 1330

## 2023-06-21 ENCOUNTER — TREATMENT (OUTPATIENT)
Dept: PHYSICAL THERAPY | Age: 63
End: 2023-06-21
Payer: COMMERCIAL

## 2023-06-21 DIAGNOSIS — G57.31 LESION OF LATERAL POPLITEAL NERVE, RIGHT LOWER LIMB: Primary | ICD-10-CM

## 2023-06-21 PROCEDURE — 97110 THERAPEUTIC EXERCISES: CPT

## 2023-06-21 NOTE — PROGRESS NOTES
[facility]          Phone: [phone] Fax: [phone]    Physical Therapy Daily Treatment Note  Date:  2023    Patient Name:  Donte Elmore    :  1960  MRN: [id number]    Evaluating therapist: Miguel Bailon PT, DPT  [id number]    Restrictions/Precautions:      Diagnosis:     Diagnosis Orders   1. Lesion of lateral popliteal nerve, right lower limb            Treatment Diagnosis:    Insurance/Certification information:  Medical [place]  Referring Physician: MAYRA Zafar  Plan of care signed (Y/N):    Visit# / total visits:  9/10  Pain level: 0/10   Time In:  1540  Time Out:  1551    Subjective:  pt amb with SPC modified indep. Pt indep w/ HEP per pt report    Pt progress with therapy has been limited by inconsistent attendance following initial evaluation on 23   Pt only attended 4 of her last 9 scheduled appointments since 23. Cancelled: 23, 5/10/23, 23  No showed :23, 23         Exercises:  Exercise/Equipment Resistance/Repetitions Other comments            Long sitting gastroc stretch Manually assisted by therapist     Seated toe flexion/extension            AAROM DF with prestretch      4-way ankle with manual resistance      Standing weight shift In // bars     Single leg stance In // bars   Ankle circles        Ankle alphabet Manual assist to prevent compensation of upper leg      BAPS PF/DF Level 1     BAPS Invs/Jesus Level 1     BAPS CW/CCW Level 1     AAROM Ankle DF            Heel raises             R DF 0° R ankle: DF 0/5, PF 3+/5                INV 3+/5, qsaim 1/5 LEFS   38/80            Other Therapeutic Activities:  Improving eversion slowly but has minimal dorsiflexion return at this stage of rehab.       Home Exercise Program:  Long sitting gastroc stretch, toe flexion/extension    Manual Treatments:      Modalities:     Time-in Time-out Total Time   00701  Evaluation Low Complexity      53075  Evaluation Med [place]

## 2023-11-02 ENCOUNTER — HOSPITAL ENCOUNTER (OUTPATIENT)
Age: 63
Discharge: HOME | End: 2023-11-04

## 2023-11-07 LAB — SURGICAL PATHOLOGY REPORT: NORMAL
